# Patient Record
Sex: MALE | Race: WHITE | NOT HISPANIC OR LATINO | Employment: OTHER | URBAN - METROPOLITAN AREA
[De-identification: names, ages, dates, MRNs, and addresses within clinical notes are randomized per-mention and may not be internally consistent; named-entity substitution may affect disease eponyms.]

---

## 2017-01-01 ENCOUNTER — OFFICE VISIT (OUTPATIENT)
Dept: INTERNAL MEDICINE | Facility: CLINIC | Age: 72
End: 2017-01-01

## 2017-01-01 VITALS
WEIGHT: 240 LBS | TEMPERATURE: 98.5 F | HEART RATE: 80 BPM | SYSTOLIC BLOOD PRESSURE: 161 MMHG | HEIGHT: 68 IN | DIASTOLIC BLOOD PRESSURE: 74 MMHG | BODY MASS INDEX: 36.37 KG/M2 | OXYGEN SATURATION: 92 %

## 2017-01-01 DIAGNOSIS — R52 PAIN: ICD-10-CM

## 2017-01-01 DIAGNOSIS — R35.1 NOCTURIA: ICD-10-CM

## 2017-01-01 DIAGNOSIS — R61 NIGHT SWEATS: ICD-10-CM

## 2017-01-01 DIAGNOSIS — K21.9 GERD WITHOUT ESOPHAGITIS: ICD-10-CM

## 2017-01-01 DIAGNOSIS — M25.50 ARTHRALGIA OF MULTIPLE JOINTS: ICD-10-CM

## 2017-01-01 DIAGNOSIS — M15.9 GENERALIZED OSTEOARTHRITIS OF MULTIPLE SITES: ICD-10-CM

## 2017-01-01 DIAGNOSIS — Z00.00 MEDICARE ANNUAL WELLNESS VISIT, SUBSEQUENT: Primary | ICD-10-CM

## 2017-01-01 DIAGNOSIS — F10.21 ALCOHOL DEPENDENCE IN REMISSION (HCC): ICD-10-CM

## 2017-01-01 DIAGNOSIS — M1A.09X0 IDIOPATHIC CHRONIC GOUT OF MULTIPLE SITES WITHOUT TOPHUS: ICD-10-CM

## 2017-01-01 DIAGNOSIS — F32.89 OTHER DEPRESSION: ICD-10-CM

## 2017-01-01 DIAGNOSIS — Z79.899 CONTROLLED SUBSTANCE AGREEMENT SIGNED: ICD-10-CM

## 2017-01-01 DIAGNOSIS — I10 ESSENTIAL HYPERTENSION: ICD-10-CM

## 2017-01-01 LAB
ALBUMIN SERPL-MCNC: 3.6 G/DL (ref 3.5–5.2)
ALBUMIN/GLOB SERPL: 1.3 G/DL
ALP SERPL-CCNC: 64 U/L (ref 39–117)
ALT SERPL-CCNC: 16 U/L (ref 1–41)
APPEARANCE UR: CLEAR
AST SERPL-CCNC: 16 U/L (ref 1–40)
BACTERIA #/AREA URNS HPF: ABNORMAL /HPF
BASOPHILS # BLD AUTO: 0.02 10*3/MM3 (ref 0–0.2)
BASOPHILS NFR BLD AUTO: 0.3 % (ref 0–1.5)
BILIRUB SERPL-MCNC: 0.5 MG/DL (ref 0.1–1.2)
BILIRUB UR QL STRIP: NEGATIVE
BUN SERPL-MCNC: 10 MG/DL (ref 8–23)
BUN/CREAT SERPL: 9.1 (ref 7–25)
CALCIUM SERPL-MCNC: 8.7 MG/DL (ref 8.6–10.5)
CASTS URNS MICRO: ABNORMAL
CHLORIDE SERPL-SCNC: 100 MMOL/L (ref 98–107)
CHOLEST SERPL-MCNC: 204 MG/DL (ref 0–200)
CO2 SERPL-SCNC: 31.5 MMOL/L (ref 22–29)
COLOR UR: YELLOW
CREAT SERPL-MCNC: 1.1 MG/DL (ref 0.76–1.27)
CRP SERPL-MCNC: 0.72 MG/DL (ref 0–0.5)
DIFFERENTIAL COMMENT: NORMAL
EOSINOPHIL # BLD AUTO: 0.33 10*3/MM3 (ref 0–0.7)
EOSINOPHIL NFR BLD AUTO: 5.1 % (ref 0.3–6.2)
EPI CELLS #/AREA URNS HPF: ABNORMAL /HPF
ERYTHROCYTE [DISTWIDTH] IN BLOOD BY AUTOMATED COUNT: 13.6 % (ref 11.5–14.5)
GLOBULIN SER CALC-MCNC: 2.8 GM/DL
GLUCOSE SERPL-MCNC: 107 MG/DL (ref 65–99)
GLUCOSE UR QL: NEGATIVE
HBA1C MFR BLD: 5.48 % (ref 4.8–5.6)
HCT VFR BLD AUTO: 44 % (ref 40.4–52.2)
HDLC SERPL-MCNC: 45 MG/DL (ref 40–60)
HGB BLD-MCNC: 14.4 G/DL (ref 13.7–17.6)
HGB UR QL STRIP: (no result)
IMM GRANULOCYTES # BLD: 0.02 10*3/MM3 (ref 0–0.03)
IMM GRANULOCYTES NFR BLD: 0.3 % (ref 0–0.5)
KETONES UR QL STRIP: NEGATIVE
LDLC SERPL CALC-MCNC: 123 MG/DL (ref 0–100)
LDLC/HDLC SERPL: 2.73 {RATIO}
LEUKOCYTE ESTERASE UR QL STRIP: NEGATIVE
LYMPHOCYTES # BLD AUTO: 1.89 10*3/MM3 (ref 0.9–4.8)
LYMPHOCYTES NFR BLD AUTO: 29.1 % (ref 19.6–45.3)
MCH RBC QN AUTO: 35.4 PG (ref 27–32.7)
MCHC RBC AUTO-ENTMCNC: 32.7 G/DL (ref 32.6–36.4)
MCV RBC AUTO: 108.1 FL (ref 79.8–96.2)
MICROALBUMIN UR-MCNC: 10.9 UG/ML
MONOCYTES # BLD AUTO: 0.43 10*3/MM3 (ref 0.2–1.2)
MONOCYTES NFR BLD AUTO: 6.6 % (ref 5–12)
NEUTROPHILS # BLD AUTO: 3.8 10*3/MM3 (ref 1.9–8.1)
NEUTROPHILS NFR BLD AUTO: 58.6 % (ref 42.7–76)
NITRITE UR QL STRIP: NEGATIVE
PH UR STRIP: 5.5 [PH] (ref 5–8)
PLATELET # BLD AUTO: 194 10*3/MM3 (ref 140–500)
PLATELET BLD QL SMEAR: NORMAL
POTASSIUM SERPL-SCNC: 4.4 MMOL/L (ref 3.5–5.2)
PROT SERPL-MCNC: 6.4 G/DL (ref 6–8.5)
PROT UR QL STRIP: NEGATIVE
PSA SERPL-MCNC: 0.37 NG/ML (ref 0–4)
RBC # BLD AUTO: 4.07 10*6/MM3 (ref 4.6–6)
RBC #/AREA URNS HPF: ABNORMAL /HPF
RBC MORPH BLD: NORMAL
SODIUM SERPL-SCNC: 142 MMOL/L (ref 136–145)
SP GR UR: 1.02 (ref 1–1.03)
T4 FREE SERPL-MCNC: 1.05 NG/DL (ref 0.93–1.7)
TRIGL SERPL-MCNC: 181 MG/DL (ref 0–150)
TSH SERPL DL<=0.005 MIU/L-ACNC: 1.19 MIU/ML (ref 0.27–4.2)
URATE SERPL-MCNC: 4.8 MG/DL (ref 3.4–7)
UROBILINOGEN UR STRIP-MCNC: (no result) MG/DL
VLDLC SERPL CALC-MCNC: 36.2 MG/DL (ref 5–40)
WBC # BLD AUTO: 6.49 10*3/MM3 (ref 4.5–10.7)
WBC #/AREA URNS HPF: ABNORMAL /HPF

## 2017-01-01 PROCEDURE — 99214 OFFICE O/P EST MOD 30 MIN: CPT | Performed by: INTERNAL MEDICINE

## 2017-01-01 PROCEDURE — G0439 PPPS, SUBSEQ VISIT: HCPCS | Performed by: INTERNAL MEDICINE

## 2017-01-01 RX ORDER — CIMETIDINE 800 MG
TABLET ORAL
Qty: 90 TABLET | Refills: 0 | OUTPATIENT
Start: 2017-01-01

## 2017-01-01 RX ORDER — PAROXETINE HYDROCHLORIDE 20 MG/1
TABLET, FILM COATED ORAL
Qty: 90 TABLET | Refills: 1 | Status: SHIPPED | OUTPATIENT
Start: 2017-01-01 | End: 2018-01-01 | Stop reason: SDUPTHER

## 2017-01-01 RX ORDER — NAPROXEN 500 MG/1
TABLET ORAL
Qty: 60 TABLET | Refills: 10 | Status: SHIPPED | OUTPATIENT
Start: 2017-01-01 | End: 2018-01-01

## 2017-01-01 RX ORDER — HYDROCODONE BITARTRATE AND ACETAMINOPHEN 7.5; 325 MG/1; MG/1
1 TABLET ORAL EVERY 6 HOURS PRN
Qty: 120 TABLET | Refills: 0 | Status: SHIPPED | OUTPATIENT
Start: 2017-01-01 | End: 2017-01-01 | Stop reason: SDUPTHER

## 2017-01-01 RX ORDER — IRBESARTAN 300 MG/1
TABLET ORAL
Qty: 90 TABLET | Refills: 1 | Status: SHIPPED | OUTPATIENT
Start: 2017-01-01 | End: 2018-01-01 | Stop reason: SDUPTHER

## 2017-01-01 RX ORDER — ALLOPURINOL 300 MG/1
TABLET ORAL
Qty: 90 TABLET | Refills: 2 | Status: SHIPPED | OUTPATIENT
Start: 2017-01-01 | End: 2018-01-01

## 2017-01-01 RX ORDER — HYDROCODONE BITARTRATE AND ACETAMINOPHEN 7.5; 325 MG/1; MG/1
1 TABLET ORAL EVERY 6 HOURS PRN
Qty: 120 TABLET | Refills: 0 | Status: SHIPPED | OUTPATIENT
Start: 2017-01-01 | End: 2018-01-01 | Stop reason: SDUPTHER

## 2017-01-01 RX ORDER — CIMETIDINE 800 MG
TABLET ORAL
Qty: 90 TABLET | Refills: 1 | Status: SHIPPED | OUTPATIENT
Start: 2017-01-01 | End: 2018-01-01

## 2017-01-16 DIAGNOSIS — R52 PAIN: ICD-10-CM

## 2017-01-16 RX ORDER — HYDROCODONE BITARTRATE AND ACETAMINOPHEN 7.5; 325 MG/1; MG/1
1 TABLET ORAL EVERY 6 HOURS PRN
Qty: 120 TABLET | Refills: 0 | Status: SHIPPED | OUTPATIENT
Start: 2017-01-16 | End: 2017-01-18 | Stop reason: SDUPTHER

## 2017-01-16 NOTE — TELEPHONE ENCOUNTER
----- Message from Lupe Pierson sent at 1/16/2017  2:21 PM EST -----  Contact: pt  Pt calling would like refill on rx. Please call when ready for pickup.    HYDROcodone-acetaminophen (NORCO) 7.5-325 MG per tablet    Pt#  [phone]

## 2017-01-18 ENCOUNTER — TELEPHONE (OUTPATIENT)
Dept: INTERNAL MEDICINE | Facility: CLINIC | Age: 72
End: 2017-01-18

## 2017-01-18 ENCOUNTER — OFFICE VISIT (OUTPATIENT)
Dept: INTERNAL MEDICINE | Facility: CLINIC | Age: 72
End: 2017-01-18

## 2017-01-18 ENCOUNTER — RESULTS ENCOUNTER (OUTPATIENT)
Dept: INTERNAL MEDICINE | Facility: CLINIC | Age: 72
End: 2017-01-18

## 2017-01-18 VITALS
OXYGEN SATURATION: 96 % | HEIGHT: 68 IN | SYSTOLIC BLOOD PRESSURE: 138 MMHG | BODY MASS INDEX: 37.89 KG/M2 | DIASTOLIC BLOOD PRESSURE: 84 MMHG | WEIGHT: 250 LBS | HEART RATE: 61 BPM

## 2017-01-18 DIAGNOSIS — E78.5 HYPERLIPIDEMIA, UNSPECIFIED HYPERLIPIDEMIA TYPE: ICD-10-CM

## 2017-01-18 DIAGNOSIS — E11.9 DIABETES MELLITUS WITHOUT COMPLICATION (HCC): ICD-10-CM

## 2017-01-18 DIAGNOSIS — Z12.11 SCREENING FOR COLON CANCER: ICD-10-CM

## 2017-01-18 DIAGNOSIS — I10 ESSENTIAL HYPERTENSION: Primary | ICD-10-CM

## 2017-01-18 DIAGNOSIS — E53.8 LOW VITAMIN B12 LEVEL: ICD-10-CM

## 2017-01-18 DIAGNOSIS — K21.9 GERD WITHOUT ESOPHAGITIS: ICD-10-CM

## 2017-01-18 LAB
ALBUMIN SERPL-MCNC: 3.53 G/DL (ref 3.4–4.6)
ALBUMIN/GLOB SERPL: 1.2 G/DL
ALP SERPL-CCNC: 81 U/L (ref 46–116)
ALT SERPL W P-5'-P-CCNC: 23 U/L (ref 16–63)
ANION GAP SERPL CALCULATED.3IONS-SCNC: 8 MMOL/L
AST SERPL-CCNC: 25 U/L (ref 7–37)
BILIRUB SERPL-MCNC: 0.8 MG/DL (ref 0.2–1)
BUN BLD-MCNC: 12 MG/DL (ref 6–22)
BUN/CREAT SERPL: 11.8 (ref 7–25)
CALCIUM SPEC-SCNC: 8.7 MG/DL (ref 8.6–10.5)
CHLORIDE SERPL-SCNC: 101 MMOL/L (ref 95–107)
CHOLEST SERPL-MCNC: 240 MG/DL (ref 0–200)
CO2 SERPL-SCNC: 30 MMOL/L (ref 23–32)
CREAT BLD-MCNC: 1.02 MG/DL (ref 0.7–1.3)
FOLATE SERPL-MCNC: 3.46 NG/ML (ref 4.78–24.2)
GFR SERPL CREATININE-BSD FRML MDRD: 72 ML/MIN/1.73
GLOBULIN UR ELPH-MCNC: 2.9 GM/DL
GLUCOSE BLD-MCNC: 125 MG/DL (ref 70–100)
HBA1C MFR BLD: 5.49 % (ref 4.8–5.6)
HDLC SERPL-MCNC: 59 MG/DL (ref 40–81)
LDLC SERPL CALC-MCNC: 131 MG/DL (ref 0–100)
LDLC/HDLC SERPL: 2.23 {RATIO}
POTASSIUM BLD-SCNC: 4 MMOL/L (ref 3.3–5.3)
PROT SERPL-MCNC: 6.4 G/DL (ref 6.3–8.4)
SODIUM BLD-SCNC: 139 MMOL/L (ref 136–145)
TRIGL SERPL-MCNC: 248 MG/DL (ref 0–150)
VIT B12 SERPL-MCNC: 168 PG/ML (ref 211–946)
VLDLC SERPL-MCNC: 49.6 MG/DL

## 2017-01-18 PROCEDURE — 80053 COMPREHEN METABOLIC PANEL: CPT | Performed by: NURSE PRACTITIONER

## 2017-01-18 PROCEDURE — 99214 OFFICE O/P EST MOD 30 MIN: CPT | Performed by: NURSE PRACTITIONER

## 2017-01-18 PROCEDURE — 36415 COLL VENOUS BLD VENIPUNCTURE: CPT | Performed by: NURSE PRACTITIONER

## 2017-01-18 PROCEDURE — 80061 LIPID PANEL: CPT | Performed by: NURSE PRACTITIONER

## 2017-01-18 NOTE — TELEPHONE ENCOUNTER
----- Message from ENDER Patel sent at 1/18/2017 10:06 AM EST -----  Regarding: Yu  Please order Yu for pt. Thanks.

## 2017-01-18 NOTE — PROGRESS NOTES
"Subjective   Steve Montana is a 71 y.o. male.         HPI Comments: He is feeling better since his last visit, states difficulty taking a deep breath has resolved (stopped Anoro due to \"feeling weird\" with medication).  His TG were elevated at last visit (+hx alcohol use).    Hyperlipidemia   This is a chronic problem. The current episode started more than 1 year ago. The problem is uncontrolled. Exacerbating diseases include diabetes. Pertinent negatives include no chest pain. He is currently on no antihyperlipidemic treatment.   Hypertension   This is a chronic problem. The current episode started more than 1 year ago. The problem is unchanged. The problem is controlled. Pertinent negatives include no chest pain, headaches, malaise/fatigue, palpitations or peripheral edema. There are no associated agents to hypertension. Risk factors for coronary artery disease include diabetes mellitus, male gender and obesity. Past treatments include ACE inhibitors and diuretics. The current treatment provides significant improvement. There are no compliance problems.  There is no history of angina, kidney disease or CAD/MI.   Diabetes   He presents for his follow-up diabetic visit. He has type 2 diabetes mellitus. No MedicAlert identification noted. Disease course: Gen OK on the blood tests: he doesn't check. There are no hypoglycemic associated symptoms. Pertinent negatives for hypoglycemia include no headaches. Associated symptoms include fatigue. Pertinent negatives for diabetes include no chest pain, no polydipsia, no visual change, no weakness and no weight loss. There are no hypoglycemic complications. Symptoms are stable. There are no diabetic complications. Risk factors for coronary artery disease include diabetes mellitus, dyslipidemia, male sex and sedentary lifestyle. When asked about current treatments, none were reported. Frequency home blood tests: doesn't test. An ACE inhibitor/angiotensin II receptor blocker is " being taken.        The following portions of the patient's history were reviewed and updated as appropriate: allergies, current medications, past social history and problem list.    Past Medical History   Diagnosis Date   • Abdominal cramping, periumbilical    • Diabetes mellitus type II, controlled, with no complications    • Essential hypertension, benign    • Hyperlipidemia    • Meningioma    • Neoplasm of uncertain behavior of skin    • Osteoarthritis, multiple sites    • Tobacco use disorder          Current Outpatient Prescriptions:   •  allopurinol (ZYLOPRIM) 300 MG tablet, TAKE ONE TABLET BY MOUTH ONCE A DAY, Disp: 90 tablet, Rfl: 3  •  aspirin 81 MG tablet, Take 81 mg by mouth., Disp: , Rfl:   •  cimetidine (TAGAMET) 800 MG tablet, TAKE ONE TABLET BY MOUTH DAILY, Disp: 90 tablet, Rfl: 0  •  hydrochlorothiazide (HYDRODIURIL) 12.5 MG tablet, Take 12.5 mg by mouth daily., Disp: , Rfl:   •  HYDROcodone-acetaminophen (NORCO) 7.5-325 MG per tablet, Take 1 tablet by mouth Every 6 (Six) Hours As Needed for moderate pain (4-6)., Disp: 120 tablet, Rfl: 0  •  irbesartan (AVAPRO) 300 MG tablet, TAKE ONE TABLET BY MOUTH ONCE DAILY, Disp: 90 tablet, Rfl: 3  •  lisinopril (PRINIVIL,ZESTRIL) 20 MG tablet, Take 20 mg by mouth., Disp: , Rfl:   •  naproxen (NAPROSYN) 500 MG tablet, Take 500 mg by mouth 2 (two) times a day with meals., Disp: , Rfl:   •  PARoxetine (PAXIL) 20 MG tablet, Take 20 mg by mouth every morning., Disp: , Rfl:     No Known Allergies    Review of Systems   Constitutional: Positive for fatigue. Negative for chills, fever, malaise/fatigue, unexpected weight change and weight loss.   HENT: Negative for congestion, ear pain, postnasal drip, sinus pressure, sore throat and trouble swallowing.    Eyes: Negative for visual disturbance.   Respiratory: Negative for cough, chest tightness and wheezing.    Cardiovascular: Negative for chest pain, palpitations and leg swelling.   Gastrointestinal: Negative for  "abdominal pain, blood in stool, nausea and vomiting.   Endocrine: Negative for cold intolerance, heat intolerance and polydipsia.   Genitourinary: Negative for dysuria, frequency and urgency.   Musculoskeletal: Negative for arthralgias and joint swelling.   Skin: Negative for color change.   Allergic/Immunologic: Negative for immunocompromised state.   Neurological: Negative for syncope, weakness and headaches.   Hematological: Does not bruise/bleed easily.       Objective   Vitals:    01/18/17 0940 01/18/17 0953   BP: 140/88 138/84   BP Location: Left arm Left arm   Patient Position: Sitting    Cuff Size: Adult    Pulse: 61    SpO2: 96%    Weight: 250 lb (113 kg)    Height: 68\" (172.7 cm)      Physical Exam   Constitutional: He is oriented to person, place, and time. He appears well-developed and well-nourished. No distress.   HENT:   Head: Normocephalic and atraumatic.   Right Ear: External ear normal.   Left Ear: External ear normal.   Eyes: Conjunctivae and EOM are normal. Pupils are equal, round, and reactive to light. No scleral icterus.   Neck: Normal range of motion. Neck supple. No thyromegaly present.   Cardiovascular: Normal rate, regular rhythm, normal heart sounds and intact distal pulses.  Exam reveals no gallop and no friction rub.    No murmur heard.  Pulmonary/Chest: Effort normal and breath sounds normal. No respiratory distress.   Lymphadenopathy:     He has no cervical adenopathy.   Neurological: He is alert and oriented to person, place, and time.   Skin: Skin is warm and dry. No rash noted. No erythema.   Psychiatric: He has a normal mood and affect. His behavior is normal.   Nursing note and vitals reviewed.      Assessment/Plan   Steve was seen today for hyperlipidemia, hypertension and diabetes.    Diagnoses and all orders for this visit:    Essential hypertension  Comments:  elevated today, he will monitor at home and call if >140/90  Orders:  -     Comprehensive Metabolic Panel; Future  -  "    Comprehensive Metabolic Panel    Low vitamin B12 level  Comments:  recheck today  Orders:  -     Vitamin B12 & Folate; Future  -     Vitamin B12 & Folate    Diabetes mellitus without complication  -     Hemoglobin A1c; Future  -     Hemoglobin A1c    GERD without esophagitis    Screening for colon cancer  Comments:  has declined additional colonoscopy, will order ColoGuard  Orders:  -     Cologuard; Future    Hyperlipidemia, unspecified hyperlipidemia type  Comments:  elevated TG with last labs, recheck today  Orders:  -     Lipid Panel; Future  -     Lipid Panel

## 2017-01-18 NOTE — MR AVS SNAPSHOT
Steve Montana   1/18/2017 10:00 AM   Office Visit    Dept Phone:  [phone]   Encounter #:  [id number]    Provider:  ENDER Patel   Department:  [facility] INTERNAL MEDICINE                Your Full Care Plan              Today's Medication Changes          These changes are accurate as of: 1/18/17 10:11 AM.  If you have any questions, ask your nurse or doctor.               Medication(s)that have changed:     HYDROcodone-acetaminophen 7.5-325 MG per tablet   Commonly known as:  NORCO   Take 1 tablet by mouth Every 6 (Six) Hours As Needed for moderate pain (4-6).   What changed:  Another medication with the same name was removed. Continue taking this medication, and follow the directions you see here.   Changed by:  Laura Weeks MA         Stop taking medication(s)listed here:     meclizine 25 MG tablet   Commonly known as:  ANTIVERT   Stopped by:  ENDER Patel           sildenafil 50 MG tablet   Commonly known as:  VIAGRA   Stopped by:  ENDER Patel           Umeclidinium-Vilanterol 62.5-25 MCG/INH aerosol powder    Commonly known as:  ANORO ELLIPTA   Stopped by:  ENDER Patel                      Your Updated Medication List          This list is accurate as of: 1/18/17 10:11 AM.  Always use your most recent med list.                allopurinol 300 MG tablet   Commonly known as:  ZYLOPRIM   TAKE ONE TABLET BY MOUTH ONCE A DAY       aspirin 81 MG tablet       cimetidine 800 MG tablet   Commonly known as:  TAGAMET   TAKE ONE TABLET BY MOUTH DAILY       hydrochlorothiazide 12.5 MG tablet   Commonly known as:  HYDRODIURIL       HYDROcodone-acetaminophen 7.5-325 MG per tablet   Commonly known as:  NORCO   Take 1 tablet by mouth Every 6 (Six) Hours As Needed for moderate pain (4-6).       irbesartan 300 MG tablet   Commonly known as:  AVAPRO   TAKE ONE TABLET BY MOUTH ONCE DAILY       lisinopril 20 MG tablet   Commonly known as:   PRINIVIL,ZESTRIL       naproxen 500 MG tablet   Commonly known as:  NAPROSYN       PARoxetine 20 MG tablet   Commonly known as:  PAXIL               You Were Diagnosed With        Codes Comments    Essential hypertension    -  Primary ICD-10-CM: I10  ICD-9-CM: 401.9     Low vitamin B12 level     ICD-10-CM: E53.8  ICD-9-CM: 266.2     Diabetes mellitus without complication     ICD-10-CM: E11.9  ICD-9-CM: 250.00     GERD without esophagitis     ICD-10-CM: K21.9  ICD-9-CM: 530.81     Screening for colon cancer     ICD-10-CM: Z12.11  ICD-9-CM: V76.51     Hyperlipidemia, unspecified hyperlipidemia type     ICD-10-CM: E78.5  ICD-9-CM: 272.4       Instructions     None    Patient Instructions History      Upcoming Appointments     Visit Type Date Time Department    FOLLOW UP 2017 10:00 AM [facility]    FOLLOW UP 2017  9:45 AM [facility] Signup     [employer] allows you to send messages to your doctor, view your test results, renew your prescriptions, schedule appointments, and more. To sign up, go to [email or web address] and click on the Sign Up Now link in the New User? box. Enter your [employer] Activation Code exactly as it appears below along with the last four digits of your Social Security Number and your Date of Birth () to complete the sign-up process. If you do not sign up before the expiration date, you must request a new code.    [employer]uin Activation Code: [email or web address]  Expires: 2017 10:08 AM    If you have questions, you can email [email or web address] or call [phone] to talk to our duuin staff. Remember, [employer] is NOT to be used for urgent needs. For medical emergencies, dial 911.               Other Info from Your Visit           Your Appointments     2017  9:45 AM EDT   Follow Up with ENDER Patel   [facility] INTERNAL MEDICINE (--)    [address] [address] [phone]         "   Arrive 15 minutes prior to appointment.              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

## 2017-01-26 ENCOUNTER — TELEPHONE (OUTPATIENT)
Dept: INTERNAL MEDICINE | Facility: CLINIC | Age: 72
End: 2017-01-26

## 2017-01-26 NOTE — TELEPHONE ENCOUNTER
----- Message from Kendy Otoole MA sent at 1/26/2017 10:59 AM EST -----  Pt returning call and says to leave [facility] if he is not available regarding lab results    Pt#[phone]

## 2017-02-14 RX ORDER — PAROXETINE HYDROCHLORIDE 20 MG/1
TABLET, FILM COATED ORAL
Qty: 90 TABLET | Refills: 1 | Status: SHIPPED | OUTPATIENT
Start: 2017-02-14 | End: 2017-01-01 | Stop reason: SDUPTHER

## 2017-02-21 DIAGNOSIS — R52 PAIN: ICD-10-CM

## 2017-02-21 RX ORDER — HYDROCODONE BITARTRATE AND ACETAMINOPHEN 7.5; 325 MG/1; MG/1
1 TABLET ORAL EVERY 6 HOURS PRN
Qty: 120 TABLET | Refills: 0 | Status: SHIPPED | OUTPATIENT
Start: 2017-02-21 | End: 2017-03-20 | Stop reason: SDUPTHER

## 2017-02-21 NOTE — TELEPHONE ENCOUNTER
----- Message from Annabelle Moraes sent at 2/21/2017 10:55 AM EST -----  Contact: Patient  Patient called requesting refill on     HYDROcodone-acetaminophen (NORCO) 7.5-325 MG per tablet    Please call when ready to be picked up.    Patient:  [phone]

## 2017-02-23 ENCOUNTER — TELEPHONE (OUTPATIENT)
Dept: INTERNAL MEDICINE | Facility: CLINIC | Age: 72
End: 2017-02-23

## 2017-02-23 DIAGNOSIS — E53.8 FOLATE DEFICIENCY: Primary | ICD-10-CM

## 2017-02-23 RX ORDER — FOLIC ACID 1 MG/1
1 TABLET ORAL DAILY
Qty: 30 TABLET | Refills: 5 | Status: SHIPPED | OUTPATIENT
Start: 2017-02-23

## 2017-02-23 NOTE — TELEPHONE ENCOUNTER
Discussed recent lab results with patient and starting Vitamin B12 and folic acid (will discuss beginning injections at f/u appt in April). Please send copy of labs to patient. Thanks.

## 2017-02-23 NOTE — TELEPHONE ENCOUNTER
Pt has not returned call regarding recent labs and low Vitamin B12 and folate levels. Called patient again and left message (per his request) that I have escribed Folic Acid to the pharmacy and that he will need to begin Vitamin B12 injections. He is advised to call the office for further instruction.

## 2017-02-23 NOTE — TELEPHONE ENCOUNTER
----- Message from Bryanna Garay sent at 2/23/2017  1:54 PM EST -----  Contact: pt - Dr Narvaez' pt - RE: return call  Pt returning call and would like a return call. Could you please return pt's call? Please advise. Thanks       Pt # [phone]

## 2017-03-02 DIAGNOSIS — K21.9 GERD WITHOUT ESOPHAGITIS: ICD-10-CM

## 2017-03-03 RX ORDER — CIMETIDINE 800 MG
TABLET ORAL
Qty: 90 TABLET | Refills: 1 | Status: SHIPPED | OUTPATIENT
Start: 2017-03-03 | End: 2017-01-01 | Stop reason: SDUPTHER

## 2017-03-03 RX ORDER — HYDROCHLOROTHIAZIDE 12.5 MG/1
TABLET ORAL
Qty: 90 TABLET | Refills: 3 | Status: SHIPPED | OUTPATIENT
Start: 2017-03-03 | End: 2018-01-01 | Stop reason: HOSPADM

## 2017-03-20 DIAGNOSIS — R52 PAIN: ICD-10-CM

## 2017-03-20 RX ORDER — HYDROCODONE BITARTRATE AND ACETAMINOPHEN 7.5; 325 MG/1; MG/1
1 TABLET ORAL EVERY 6 HOURS PRN
Qty: 120 TABLET | Refills: 0 | Status: SHIPPED | OUTPATIENT
Start: 2017-03-20 | End: 2017-04-18 | Stop reason: SDUPTHER

## 2017-03-20 NOTE — TELEPHONE ENCOUNTER
----- Message from Bryanna Garay sent at 3/20/2017  9:12 AM EDT -----  Contact: pt - Dr Narvaez' pt - RE: script  Pt calling and would like a refill on Rx      HYDROcodone-acetaminophen (NORCO) 7.5-325 MG per tablet 120 tablet     Sig - Route: Take 1 tablet by mouth Every 6 (Six) Hours As Needed for moderate pain (4-6). - Oral        Pt # [phone]

## 2017-04-18 DIAGNOSIS — R52 PAIN: ICD-10-CM

## 2017-04-18 RX ORDER — HYDROCODONE BITARTRATE AND ACETAMINOPHEN 7.5; 325 MG/1; MG/1
1 TABLET ORAL EVERY 6 HOURS PRN
Qty: 120 TABLET | Refills: 0 | Status: SHIPPED | OUTPATIENT
Start: 2017-04-18 | End: 2017-05-17 | Stop reason: SDUPTHER

## 2017-04-18 NOTE — TELEPHONE ENCOUNTER
----- Message from Bryanna Garay sent at 4/18/2017  2:34 PM EDT -----  Contact: pt - Dr Narvaez' pt - RE: script  Pt calling and would like a refill on Rx      HYDROcodone-acetaminophen (NORCO) 7.5-325 MG per tablet 120 tablet      Sig - Route: Take 1 tablet by mouth Every 6 (Six) Hours As Needed for Moderate Pain (4-6). - Oral      Pt # [phone]

## 2017-04-20 ENCOUNTER — OFFICE VISIT (OUTPATIENT)
Dept: INTERNAL MEDICINE | Facility: CLINIC | Age: 72
End: 2017-04-20

## 2017-04-20 VITALS
HEIGHT: 68 IN | OXYGEN SATURATION: 95 % | HEART RATE: 59 BPM | DIASTOLIC BLOOD PRESSURE: 84 MMHG | BODY MASS INDEX: 36.53 KG/M2 | WEIGHT: 241 LBS | SYSTOLIC BLOOD PRESSURE: 124 MMHG

## 2017-04-20 DIAGNOSIS — E53.8 FOLATE DEFICIENCY: ICD-10-CM

## 2017-04-20 DIAGNOSIS — E53.8 LOW VITAMIN B12 LEVEL: ICD-10-CM

## 2017-04-20 DIAGNOSIS — I10 ESSENTIAL HYPERTENSION: ICD-10-CM

## 2017-04-20 DIAGNOSIS — E11.9 DIABETES MELLITUS WITHOUT COMPLICATION (HCC): Primary | ICD-10-CM

## 2017-04-20 LAB
ALBUMIN SERPL-MCNC: 3.64 G/DL (ref 3.4–4.6)
ALBUMIN/GLOB SERPL: 1.2 G/DL
ALP SERPL-CCNC: 72 U/L (ref 46–116)
ALT SERPL W P-5'-P-CCNC: 22 U/L (ref 16–63)
ANION GAP SERPL CALCULATED.3IONS-SCNC: 8 MMOL/L
AST SERPL-CCNC: 18 U/L (ref 7–37)
BILIRUB SERPL-MCNC: 0.6 MG/DL (ref 0.2–1)
BUN BLD-MCNC: 11 MG/DL (ref 6–22)
BUN/CREAT SERPL: 10.8 (ref 7–25)
CALCIUM SPEC-SCNC: 8.8 MG/DL (ref 8.6–10.5)
CHLORIDE SERPL-SCNC: 99 MMOL/L (ref 95–107)
CO2 SERPL-SCNC: 31 MMOL/L (ref 23–32)
CREAT BLD-MCNC: 1.02 MG/DL (ref 0.7–1.3)
FOLATE SERPL-MCNC: 12.72 NG/ML (ref 4.78–24.2)
GFR SERPL CREATININE-BSD FRML MDRD: 72 ML/MIN/1.73
GLOBULIN UR ELPH-MCNC: 3 GM/DL
GLUCOSE BLD-MCNC: 125 MG/DL (ref 70–100)
HBA1C MFR BLD: 5.5 % (ref 4–6)
POTASSIUM BLD-SCNC: 3.7 MMOL/L (ref 3.3–5.3)
PROT SERPL-MCNC: 6.6 G/DL (ref 6.3–8.4)
SODIUM BLD-SCNC: 138 MMOL/L (ref 136–145)
VIT B12 SERPL-MCNC: 364 PG/ML (ref 211–946)

## 2017-04-20 PROCEDURE — 80053 COMPREHEN METABOLIC PANEL: CPT | Performed by: NURSE PRACTITIONER

## 2017-04-20 PROCEDURE — 83036 HEMOGLOBIN GLYCOSYLATED A1C: CPT | Performed by: NURSE PRACTITIONER

## 2017-04-20 PROCEDURE — 99214 OFFICE O/P EST MOD 30 MIN: CPT | Performed by: NURSE PRACTITIONER

## 2017-04-21 ENCOUNTER — TELEPHONE (OUTPATIENT)
Dept: INTERNAL MEDICINE | Facility: CLINIC | Age: 72
End: 2017-04-21

## 2017-04-21 RX ORDER — IRBESARTAN 300 MG/1
300 TABLET ORAL DAILY
Qty: 90 TABLET | Refills: 3
Start: 2017-04-21 | End: 2018-01-01 | Stop reason: SDUPTHER

## 2017-04-21 NOTE — PROGRESS NOTES
Subjective   Steve Montana is a 71 y.o. male who presents for f/u regarding DM2, HTN, and hyperlipidemia.    HPI Comments: He started taking oral Vitamin B12 and Folic Acid since last visit and reports mild improvement in energy. Unfortunately he continues to drink daily.    Hyperlipidemia   This is a chronic problem. The current episode started more than 1 year ago. The problem is uncontrolled. Exacerbating diseases include diabetes. Pertinent negatives include no chest pain. He is currently on no antihyperlipidemic treatment.   Hypertension   This is a chronic problem. The current episode started more than 1 year ago. The problem is unchanged. The problem is controlled. Pertinent negatives include no chest pain, headaches, malaise/fatigue, palpitations or peripheral edema. There are no associated agents to hypertension. Risk factors for coronary artery disease include diabetes mellitus, male gender and obesity. Past treatments include ACE inhibitors and diuretics. The current treatment provides significant improvement. There are no compliance problems.  There is no history of angina, kidney disease or CAD/MI.   Diabetes   He presents for his follow-up diabetic visit. He has type 2 diabetes mellitus. No MedicAlert identification noted. Disease course: Gen OK on the blood tests: he doesn't check. There are no hypoglycemic associated symptoms. Pertinent negatives for hypoglycemia include no headaches. Associated symptoms include fatigue. Pertinent negatives for diabetes include no chest pain, no polydipsia, no visual change, no weakness and no weight loss. There are no hypoglycemic complications. Symptoms are stable. There are no diabetic complications. Risk factors for coronary artery disease include diabetes mellitus, dyslipidemia, male sex and sedentary lifestyle. When asked about current treatments, none were reported. Frequency home blood tests: doesn't test. An ACE inhibitor/angiotensin II receptor blocker is  being taken.        The following portions of the patient's history were reviewed and updated as appropriate: allergies, current medications, past social history and problem list.    Past Medical History:   Diagnosis Date   • Abdominal cramping, periumbilical    • Diabetes mellitus type II, controlled, with no complications    • Essential hypertension, benign    • Hyperlipidemia    • Meningioma    • Neoplasm of uncertain behavior of skin    • Osteoarthritis, multiple sites    • Tobacco use disorder          Current Outpatient Prescriptions:   •  allopurinol (ZYLOPRIM) 300 MG tablet, TAKE ONE TABLET BY MOUTH ONCE A DAY, Disp: 90 tablet, Rfl: 3  •  aspirin 81 MG tablet, Take 81 mg by mouth., Disp: , Rfl:   •  cimetidine (TAGAMET) 800 MG tablet, TAKE ONE TABLET BY MOUTH DAILY, Disp: 90 tablet, Rfl: 1  •  folic acid (FOLVITE) 1 MG tablet, Take 1 tablet by mouth Daily., Disp: 30 tablet, Rfl: 5  •  hydrochlorothiazide (HYDRODIURIL) 12.5 MG tablet, TAKE ONE TABLET BY MOUTH EVERY MORNING, Disp: 90 tablet, Rfl: 3  •  HYDROcodone-acetaminophen (NORCO) 7.5-325 MG per tablet, Take 1 tablet by mouth Every 6 (Six) Hours As Needed for Moderate Pain (4-6)., Disp: 120 tablet, Rfl: 0  •  irbesartan (AVAPRO) 300 MG tablet, Take 1 tablet by mouth Daily., Disp: 90 tablet, Rfl: 3  •  naproxen (NAPROSYN) 500 MG tablet, Take 500 mg by mouth 2 (two) times a day with meals., Disp: , Rfl:   •  PARoxetine (PAXIL) 20 MG tablet, TAKE ONE TABLET BY MOUTH DAILY, Disp: 90 tablet, Rfl: 1  •  cyanocobalamin ([employer]S VITAMIN B-12) 1000 MCG tablet, 1 tablet daily, Disp: 30 tablet, Rfl: 5    No Known Allergies    Review of Systems   Constitutional: Positive for fatigue. Negative for chills, fever, malaise/fatigue, unexpected weight change and weight loss.   HENT: Negative for congestion, ear pain, postnasal drip, sinus pressure, sore throat and trouble swallowing.    Eyes: Negative for visual disturbance.   Respiratory: Negative for cough, chest tightness  "and wheezing.    Cardiovascular: Negative for chest pain, palpitations and leg swelling.   Gastrointestinal: Negative for abdominal pain, blood in stool, nausea and vomiting.   Endocrine: Negative for cold intolerance, heat intolerance and polydipsia.   Genitourinary: Negative for dysuria, frequency and urgency.   Musculoskeletal: Negative for arthralgias and joint swelling.   Skin: Negative for color change.   Allergic/Immunologic: Negative for immunocompromised state.   Neurological: Negative for syncope, weakness and headaches.   Hematological: Does not bruise/bleed easily.       Objective   Vitals:    04/20/17 0952   BP: 124/84   BP Location: Left arm   Patient Position: Sitting   Cuff Size: Adult   Pulse: 59   SpO2: 95%   Weight: 241 lb (109 kg)   Height: 68\" (172.7 cm)     Physical Exam   Constitutional: He is oriented to person, place, and time. He appears well-developed and well-nourished. No distress.   HENT:   Head: Normocephalic and atraumatic.   Right Ear: External ear normal.   Left Ear: External ear normal.   Eyes: Conjunctivae are normal.   Neck: Normal range of motion. Neck supple.   Cardiovascular: Normal rate, regular rhythm, normal heart sounds and intact distal pulses.  Exam reveals no gallop and no friction rub.    No murmur heard.  Pulmonary/Chest: Effort normal and breath sounds normal. No respiratory distress.   Lymphadenopathy:     He has no cervical adenopathy.   Neurological: He is alert and oriented to person, place, and time.   Skin: Skin is warm and dry. No rash noted. No erythema.   Psychiatric: He has a normal mood and affect. His behavior is normal.   Nursing note and vitals reviewed.      Assessment/Plan   Steve was seen today for hyperlipidemia, hypertension and knee pain.    Diagnoses and all orders for this visit:    Diabetes mellitus without complication  -     Hemoglobin A1c; Future  -     Hemoglobin A1c    Low vitamin B12 level  Comments:  he declined injections with last " labs, will recheck levels since taking oral suppl  Orders:  -     cyanocobalamin (CVS VITAMIN B-12) 1000 MCG tablet; 1 tablet daily  -     Vitamin B12 & Folate; Future  -     Vitamin B12 & Folate    Folate deficiency  -     Vitamin B12 & Folate; Future  -     Vitamin B12 & Folate    Essential hypertension  Comments:  stable on meds  Orders:  -     Comprehensive Metabolic Panel; Future  -     Comprehensive Metabolic Panel  -     irbesartan (AVAPRO) 300 MG tablet; Take 1 tablet by mouth Daily.    He has declined screening colonoscopy but did complete ColoGuard 3/2017, will request copy of results.

## 2017-05-17 DIAGNOSIS — R52 PAIN: ICD-10-CM

## 2017-05-17 RX ORDER — HYDROCODONE BITARTRATE AND ACETAMINOPHEN 7.5; 325 MG/1; MG/1
1 TABLET ORAL EVERY 6 HOURS PRN
Qty: 120 TABLET | Refills: 0 | Status: SHIPPED | OUTPATIENT
Start: 2017-05-17 | End: 2017-06-19 | Stop reason: SDUPTHER

## 2017-06-19 DIAGNOSIS — R52 PAIN: ICD-10-CM

## 2017-06-19 RX ORDER — HYDROCODONE BITARTRATE AND ACETAMINOPHEN 7.5; 325 MG/1; MG/1
1 TABLET ORAL EVERY 6 HOURS PRN
Qty: 120 TABLET | Refills: 0 | Status: SHIPPED | OUTPATIENT
Start: 2017-06-19 | End: 2017-01-01 | Stop reason: SDUPTHER

## 2017-06-19 NOTE — TELEPHONE ENCOUNTER
----- Message from Kendy Otoole MA sent at 6/19/2017  8:32 AM EDT -----  Pt calling for refill    Hydrocodone 7.5/325mg    lov 4/20  Next appt 8/10    Pt#[phone]

## 2017-07-18 NOTE — TELEPHONE ENCOUNTER
----- Message from Bryanna Garay sent at 7/18/2017  3:03 PM EDT -----  Contact: pt - Dr Narvaez'  pt - RE: script  Pt calling and would like a refill on Rx      HYDROcodone-acetaminophen (NORCO) 7.5-325 MG per tablet 120 tablet      Sig - Route: Take 1 tablet by mouth Every 6 (Six) Hours As Needed for Moderate Pain (4-6). - Oral      Pt # [phone]

## 2017-08-08 PROBLEM — M25.50 ARTHRALGIA OF MULTIPLE JOINTS: Status: ACTIVE | Noted: 2017-01-01

## 2017-08-08 PROBLEM — Z79.899 CONTROLLED SUBSTANCE AGREEMENT SIGNED: Status: ACTIVE | Noted: 2017-01-01

## 2017-08-08 PROBLEM — M15.9 GENERALIZED OSTEOARTHRITIS OF MULTIPLE SITES: Status: ACTIVE | Noted: 2017-01-01

## 2017-08-08 PROBLEM — F32.A DEPRESSION: Status: ACTIVE | Noted: 2017-01-01

## 2017-08-08 PROBLEM — M1A.09X0 CHRONIC GOUT OF MULTIPLE SITES: Status: ACTIVE | Noted: 2017-01-01

## 2017-08-08 PROBLEM — Z00.00 MEDICARE ANNUAL WELLNESS VISIT, SUBSEQUENT: Status: ACTIVE | Noted: 2017-01-01

## 2017-08-08 PROBLEM — R61 NIGHT SWEATS: Status: ACTIVE | Noted: 2017-01-01

## 2017-08-08 PROBLEM — R35.1 NOCTURIA: Status: ACTIVE | Noted: 2017-01-01

## 2017-08-08 NOTE — PATIENT INSTRUCTIONS
Medicare Wellness  Personal Prevention Plan of Service     Date of Office Visit:  2017  Encounter Provider:  Todd Craven MD  Place of Service:  [facility] INTERNAL MEDICINE  Patient Name: Steve Montana  :  1945    As part of the Medicare Wellness portion of your visit today, we are providing you with this personalized preventive plan of services (PPPS). This plan is based upon recommendations of the [place] Preventive Services Task Force (USPSTF) and the Advisory Committee on Immunization Practices (ACIP).    This lists the preventive care services that should be considered, and provides dates of when you are due. Items listed as completed are up-to-date and do not require any further intervention.    Health Maintenance   Topic Date Due   • TDAP/TD VACCINES (1 - Tdap) 1965   • ZOSTER VACCINE  2016   • INFLUENZA VACCINE  2017   • HEMOGLOBIN A1C  10/20/2017   • LIPID PANEL  2018   • DIABETIC EYE EXAM  2018   • MEDICARE ANNUAL WELLNESS  2018   • DIABETIC FOOT EXAM  2018   • URINE MICROALBUMIN  2018   • COLONOSCOPY  2027   • HEPATITIS C SCREENING  Addressed   • PNEUMOCOCCAL VACCINES (65+ LOW/MEDIUM RISK)  Completed   • AAA SCREEN (ONE-TIME)  Addressed       Orders Placed This Encounter   Procedures   • Hemoglobin A1c   • Lipid Panel With LDL / HDL Ratio   • PSA   • Comprehensive Metabolic Panel   • T4, Free   • TSH   • C-reactive Protein   • MicroAlbumin, Urine, Random   • Uric Acid   • CBC & Differential     Order Specific Question:   Manual Differential     Answer:   No   • Urinalysis With Microscopic       No Follow-up on file.

## 2017-08-20 NOTE — PROGRESS NOTES
Subjective   Steve Montana is a 72 y.o. male.   He is here today for Medicare annual wellness visit subsequent along with depression and alcohol dependence in remission hypertension arthralgia multiple joints chronic gout osteoarthritis multiple sites control substance agreement signed night sweats nocturia and pain  History of Present Illness   He is here today for Medicare annual visit subsequent along with depression and alcohol dependence in remission hypertension arthralgia multiple joints chronic gout osteoarthritis of multiple sites control substance agreement signed and night sweats and nocturia and pain  The following portions of the patient's history were reviewed and updated as appropriate: allergies, current medications, past family history, past medical history, past social history, past surgical history and problem list.    Review of Systems   Constitutional: Positive for diaphoresis.   Musculoskeletal: Positive for arthralgias.   All other systems reviewed and are negative.      Objective   Physical Exam   Constitutional: He is oriented to person, place, and time. He appears well-developed and well-nourished. He is cooperative.   HENT:   Head: Normocephalic and atraumatic.   Right Ear: Hearing, tympanic membrane, external ear and ear canal normal.   Left Ear: Hearing, tympanic membrane, external ear and ear canal normal.   Nose: Nose normal.   Mouth/Throat: Uvula is midline, oropharynx is clear and moist and mucous membranes are normal.   Eyes: Conjunctivae, EOM and lids are normal. Pupils are equal, round, and reactive to light.   Neck: Phonation normal. Neck supple. Carotid bruit is not present.   Cardiovascular: Normal rate, regular rhythm and normal heart sounds.  Exam reveals no gallop and no friction rub.    No murmur heard.  Pulmonary/Chest: Effort normal and breath sounds normal. No respiratory distress.   Abdominal: Soft. Bowel sounds are normal. He exhibits no distension and no mass. There  is no hepatosplenomegaly. There is no tenderness. There is no rebound and no guarding. No hernia.   Musculoskeletal: He exhibits no edema.   Neurological: He is alert and oriented to person, place, and time. Coordination and gait normal.   Skin: Skin is warm and dry.   Psychiatric: His speech is normal and behavior is normal. Judgment and thought content normal. His mood appears anxious. He exhibits a depressed mood.   Nursing note and vitals reviewed.      Assessment/Plan   Diagnoses and all orders for this visit:    Medicare annual wellness visit, subsequent  -     Hemoglobin A1c  -     Lipid Panel With LDL / HDL Ratio  -     PSA  -     CBC & Differential  -     Comprehensive Metabolic Panel  -     T4, Free  -     TSH  -     Urinalysis With Microscopic  -     C-reactive Protein  -     MicroAlbumin, Urine, Random  -     Uric Acid    Other depression  -     Hemoglobin A1c  -     Lipid Panel With LDL / HDL Ratio  -     PSA  -     CBC & Differential  -     Comprehensive Metabolic Panel  -     T4, Free  -     TSH  -     Urinalysis With Microscopic  -     C-reactive Protein  -     MicroAlbumin, Urine, Random  -     Uric Acid    Alcohol dependence in remission  -     Hemoglobin A1c  -     Lipid Panel With LDL / HDL Ratio  -     PSA  -     CBC & Differential  -     Comprehensive Metabolic Panel  -     T4, Free  -     TSH  -     Urinalysis With Microscopic  -     C-reactive Protein  -     MicroAlbumin, Urine, Random  -     Uric Acid    Essential hypertension  -     Hemoglobin A1c  -     Lipid Panel With LDL / HDL Ratio  -     PSA  -     CBC & Differential  -     Comprehensive Metabolic Panel  -     T4, Free  -     TSH  -     Urinalysis With Microscopic  -     C-reactive Protein  -     MicroAlbumin, Urine, Random  -     Uric Acid    Arthralgia of multiple joints  -     Hemoglobin A1c  -     Lipid Panel With LDL / HDL Ratio  -     PSA  -     CBC & Differential  -     Comprehensive Metabolic Panel  -     T4, Free  -      TSH  -     Urinalysis With Microscopic  -     C-reactive Protein  -     MicroAlbumin, Urine, Random  -     Uric Acid    Idiopathic chronic gout of multiple sites without tophus  -     Hemoglobin A1c  -     Lipid Panel With LDL / HDL Ratio  -     PSA  -     CBC & Differential  -     Comprehensive Metabolic Panel  -     T4, Free  -     TSH  -     Urinalysis With Microscopic  -     C-reactive Protein  -     MicroAlbumin, Urine, Random  -     Uric Acid    Generalized osteoarthritis of multiple sites  -     Hemoglobin A1c  -     Lipid Panel With LDL / HDL Ratio  -     PSA  -     CBC & Differential  -     Comprehensive Metabolic Panel  -     T4, Free  -     TSH  -     Urinalysis With Microscopic  -     C-reactive Protein  -     MicroAlbumin, Urine, Random  -     Uric Acid    Controlled substance agreement signed  -     Hemoglobin A1c  -     Lipid Panel With LDL / HDL Ratio  -     PSA  -     CBC & Differential  -     Comprehensive Metabolic Panel  -     T4, Free  -     TSH  -     Urinalysis With Microscopic  -     C-reactive Protein  -     MicroAlbumin, Urine, Random  -     Uric Acid    Night sweats  -     Hemoglobin A1c  -     Lipid Panel With LDL / HDL Ratio  -     PSA  -     CBC & Differential  -     Comprehensive Metabolic Panel  -     T4, Free  -     TSH  -     Urinalysis With Microscopic  -     C-reactive Protein  -     MicroAlbumin, Urine, Random  -     Uric Acid    Nocturia  -     Hemoglobin A1c  -     Lipid Panel With LDL / HDL Ratio  -     PSA  -     CBC & Differential  -     Comprehensive Metabolic Panel  -     T4, Free  -     TSH  -     Urinalysis With Microscopic  -     C-reactive Protein  -     MicroAlbumin, Urine, Random  -     Uric Acid    Pain  -     HYDROcodone-acetaminophen (NORCO) 7.5-325 MG per tablet; Take 1 tablet by mouth Every 6 (Six) Hours As Needed for Severe Pain (7-10).    Other orders  -     Manual Differential  -     Microscopic Examination      Medicare annual wellness visit initial  following recommendation  Nocturia supportive meds  Pain supportive meds for this as well  Night sweats follow with C-reactive protein  Control substance agreement signed today for pain meds  Idiopathic chronic gout stable  Generalized osteoarthritis of multiple sites supportive meds physical therapy  Arthralgia or joints noted  Hypertension we'll controlled on current medication normally  Alcohol dependence in remission stable  Depression stable on current medication

## 2018-01-01 ENCOUNTER — TELEPHONE (OUTPATIENT)
Dept: CARDIOLOGY | Facility: CLINIC | Age: 73
End: 2018-01-01

## 2018-01-01 ENCOUNTER — HOSPITAL ENCOUNTER (OUTPATIENT)
Dept: CARDIOLOGY | Facility: HOSPITAL | Age: 73
Setting detail: RECURRING SERIES
Discharge: HOME | End: 2018-03-20

## 2018-01-01 ENCOUNTER — OFFICE VISIT (OUTPATIENT)
Dept: CARDIOLOGY | Facility: CLINIC | Age: 73
End: 2018-01-01

## 2018-01-01 ENCOUNTER — HOSPITAL ENCOUNTER (OUTPATIENT)
Dept: CARDIOLOGY | Facility: HOSPITAL | Age: 73
Setting detail: RECURRING SERIES
Discharge: HOME | End: 2018-05-31

## 2018-01-01 ENCOUNTER — HOSPITAL ENCOUNTER (OUTPATIENT)
Dept: CARDIOLOGY | Facility: HOSPITAL | Age: 73
Setting detail: RECURRING SERIES
Discharge: HOME | End: 2018-06-08

## 2018-01-01 ENCOUNTER — HOSPITAL ENCOUNTER (OUTPATIENT)
Dept: CARDIOLOGY | Facility: HOSPITAL | Age: 73
Setting detail: RECURRING SERIES
Discharge: HOME | End: 2018-03-30

## 2018-01-01 ENCOUNTER — HOSPITAL ENCOUNTER (OUTPATIENT)
Dept: CARDIOLOGY | Facility: HOSPITAL | Age: 73
Discharge: HOME | End: 2018-02-08
Admitting: INTERNAL MEDICINE

## 2018-01-01 ENCOUNTER — HOSPITAL ENCOUNTER (OUTPATIENT)
Dept: CARDIOLOGY | Facility: HOSPITAL | Age: 73
Setting detail: RECURRING SERIES
Discharge: HOME | End: 2018-04-06

## 2018-01-01 ENCOUNTER — HOSPITAL ENCOUNTER (OUTPATIENT)
Dept: CARDIOLOGY | Facility: HOSPITAL | Age: 73
Setting detail: RECURRING SERIES
Discharge: HOME | End: 2018-03-23

## 2018-01-01 ENCOUNTER — EPISODE CHANGES (OUTPATIENT)
Dept: CASE MANAGEMENT | Facility: OTHER | Age: 73
End: 2018-01-01

## 2018-01-01 ENCOUNTER — OFFICE VISIT (OUTPATIENT)
Dept: INTERNAL MEDICINE | Facility: CLINIC | Age: 73
End: 2018-01-01

## 2018-01-01 ENCOUNTER — HOSPITAL ENCOUNTER (OUTPATIENT)
Dept: CARDIOLOGY | Facility: HOSPITAL | Age: 73
Setting detail: RECURRING SERIES
Discharge: HOME | End: 2018-06-22

## 2018-01-01 ENCOUNTER — TELEPHONE (OUTPATIENT)
Dept: SOCIAL WORK | Facility: HOSPITAL | Age: 73
End: 2018-01-01

## 2018-01-01 ENCOUNTER — HOSPITAL ENCOUNTER (OUTPATIENT)
Dept: CARDIOLOGY | Facility: HOSPITAL | Age: 73
Setting detail: RECURRING SERIES
Discharge: HOME | End: 2018-05-17

## 2018-01-01 ENCOUNTER — HOSPITAL ENCOUNTER (OUTPATIENT)
Dept: CARDIOLOGY | Facility: HOSPITAL | Age: 73
Setting detail: RECURRING SERIES
Discharge: HOME | End: 2018-04-20

## 2018-01-01 ENCOUNTER — LAB (OUTPATIENT)
Dept: LAB | Facility: HOSPITAL | Age: 73
End: 2018-01-01

## 2018-01-01 ENCOUNTER — APPOINTMENT (OUTPATIENT)
Dept: RESPIRATORY THERAPY | Facility: HOSPITAL | Age: 73
End: 2018-01-01
Attending: INTERNAL MEDICINE

## 2018-01-01 ENCOUNTER — HOSPITAL ENCOUNTER (INPATIENT)
Facility: HOSPITAL | Age: 73
LOS: 6 days | Discharge: HOME | End: 2018-03-18
Attending: INTERNAL MEDICINE | Admitting: INTERNAL MEDICINE

## 2018-01-01 ENCOUNTER — HOSPITAL ENCOUNTER (OUTPATIENT)
Dept: CARDIOLOGY | Facility: HOSPITAL | Age: 73
Setting detail: RECURRING SERIES
Discharge: HOME | End: 2018-04-13

## 2018-01-01 ENCOUNTER — HOSPITAL ENCOUNTER (OUTPATIENT)
Dept: CARDIOLOGY | Facility: HOSPITAL | Age: 73
Discharge: HOME | End: 2018-06-06
Admitting: NURSE PRACTITIONER

## 2018-01-01 ENCOUNTER — HOSPITAL ENCOUNTER (OUTPATIENT)
Dept: CARDIOLOGY | Facility: HOSPITAL | Age: 73
Discharge: HOME | End: 2018-02-08
Attending: INTERNAL MEDICINE

## 2018-01-01 ENCOUNTER — HOSPITAL ENCOUNTER (OUTPATIENT)
Dept: CARDIOLOGY | Facility: HOSPITAL | Age: 73
Setting detail: RECURRING SERIES
Discharge: HOME | End: 2018-05-10

## 2018-01-01 ENCOUNTER — APPOINTMENT (OUTPATIENT)
Dept: SLEEP MEDICINE | Facility: HOSPITAL | Age: 73
End: 2018-01-01
Attending: INTERNAL MEDICINE

## 2018-01-01 ENCOUNTER — TREATMENT (OUTPATIENT)
Dept: PHYSICAL THERAPY | Facility: CLINIC | Age: 73
End: 2018-01-01

## 2018-01-01 ENCOUNTER — CLINICAL SUPPORT (OUTPATIENT)
Dept: CARDIOLOGY | Facility: CLINIC | Age: 73
End: 2018-01-01

## 2018-01-01 ENCOUNTER — TELEPHONE (OUTPATIENT)
Dept: CARDIOLOGY | Facility: HOSPITAL | Age: 73
End: 2018-01-01

## 2018-01-01 ENCOUNTER — HOSPITAL ENCOUNTER (OUTPATIENT)
Dept: CARDIOLOGY | Facility: HOSPITAL | Age: 73
Setting detail: RECURRING SERIES
Discharge: HOME | End: 2018-04-26

## 2018-01-01 ENCOUNTER — HOSPITAL ENCOUNTER (EMERGENCY)
Facility: HOSPITAL | Age: 73
Discharge: HOME | End: 2018-02-11
Attending: EMERGENCY MEDICINE | Admitting: EMERGENCY MEDICINE

## 2018-01-01 ENCOUNTER — APPOINTMENT (OUTPATIENT)
Dept: GENERAL RADIOLOGY | Facility: HOSPITAL | Age: 73
End: 2018-01-01

## 2018-01-01 VITALS
SYSTOLIC BLOOD PRESSURE: 130 MMHG | HEART RATE: 134 BPM | HEIGHT: 68 IN | DIASTOLIC BLOOD PRESSURE: 80 MMHG | BODY MASS INDEX: 39.25 KG/M2 | WEIGHT: 259 LBS

## 2018-01-01 VITALS
HEART RATE: 124 BPM | OXYGEN SATURATION: 91 % | HEIGHT: 68 IN | SYSTOLIC BLOOD PRESSURE: 124 MMHG | WEIGHT: 246 LBS | DIASTOLIC BLOOD PRESSURE: 90 MMHG | BODY MASS INDEX: 37.28 KG/M2

## 2018-01-01 VITALS
WEIGHT: 213.25 LBS | HEIGHT: 68 IN | RESPIRATION RATE: 20 BRPM | OXYGEN SATURATION: 92 % | TEMPERATURE: 98 F | BODY MASS INDEX: 32.32 KG/M2 | SYSTOLIC BLOOD PRESSURE: 108 MMHG | DIASTOLIC BLOOD PRESSURE: 97 MMHG | HEART RATE: 81 BPM

## 2018-01-01 VITALS
BODY MASS INDEX: 35.77 KG/M2 | WEIGHT: 236 LBS | HEIGHT: 68 IN | DIASTOLIC BLOOD PRESSURE: 72 MMHG | HEART RATE: 49 BPM | SYSTOLIC BLOOD PRESSURE: 138 MMHG

## 2018-01-01 VITALS
HEIGHT: 68 IN | TEMPERATURE: 98.1 F | HEART RATE: 96 BPM | BODY MASS INDEX: 36.43 KG/M2 | WEIGHT: 240.4 LBS | OXYGEN SATURATION: 90 % | DIASTOLIC BLOOD PRESSURE: 82 MMHG | SYSTOLIC BLOOD PRESSURE: 162 MMHG

## 2018-01-01 VITALS
OXYGEN SATURATION: 95 % | SYSTOLIC BLOOD PRESSURE: 133 MMHG | BODY MASS INDEX: 37.89 KG/M2 | WEIGHT: 250 LBS | HEART RATE: 67 BPM | TEMPERATURE: 98.4 F | DIASTOLIC BLOOD PRESSURE: 99 MMHG | RESPIRATION RATE: 16 BRPM | HEIGHT: 68 IN

## 2018-01-01 VITALS
SYSTOLIC BLOOD PRESSURE: 110 MMHG | HEART RATE: 38 BPM | WEIGHT: 233.8 LBS | DIASTOLIC BLOOD PRESSURE: 72 MMHG | BODY MASS INDEX: 35.43 KG/M2 | HEIGHT: 68 IN

## 2018-01-01 VITALS
BODY MASS INDEX: 39.25 KG/M2 | WEIGHT: 259 LBS | DIASTOLIC BLOOD PRESSURE: 80 MMHG | HEIGHT: 68 IN | SYSTOLIC BLOOD PRESSURE: 130 MMHG | HEART RATE: 136 BPM

## 2018-01-01 VITALS
SYSTOLIC BLOOD PRESSURE: 130 MMHG | OXYGEN SATURATION: 92 % | HEIGHT: 68 IN | RESPIRATION RATE: 18 BRPM | DIASTOLIC BLOOD PRESSURE: 100 MMHG | WEIGHT: 240 LBS | HEART RATE: 140 BPM | BODY MASS INDEX: 36.37 KG/M2

## 2018-01-01 DIAGNOSIS — H81.10 BPPV (BENIGN PAROXYSMAL POSITIONAL VERTIGO), UNSPECIFIED LATERALITY: ICD-10-CM

## 2018-01-01 DIAGNOSIS — I51.9 LEFT VENTRICULAR DYSFUNCTION: ICD-10-CM

## 2018-01-01 DIAGNOSIS — I50.43 ACUTE ON CHRONIC SYSTOLIC AND DIASTOLIC HEART FAILURE, NYHA CLASS 3 (HCC): ICD-10-CM

## 2018-01-01 DIAGNOSIS — R06.02 SHORTNESS OF BREATH: ICD-10-CM

## 2018-01-01 DIAGNOSIS — I48.0 PAROXYSMAL ATRIAL FIBRILLATION (HCC): ICD-10-CM

## 2018-01-01 DIAGNOSIS — F17.290 CIGAR SMOKER: ICD-10-CM

## 2018-01-01 DIAGNOSIS — F32.89 OTHER DEPRESSION: ICD-10-CM

## 2018-01-01 DIAGNOSIS — R31.0 GROSS HEMATURIA: ICD-10-CM

## 2018-01-01 DIAGNOSIS — M15.9 GENERALIZED OSTEOARTHRITIS OF MULTIPLE SITES: ICD-10-CM

## 2018-01-01 DIAGNOSIS — G47.33 OSA (OBSTRUCTIVE SLEEP APNEA): Primary | ICD-10-CM

## 2018-01-01 DIAGNOSIS — I10 ESSENTIAL HYPERTENSION: ICD-10-CM

## 2018-01-01 DIAGNOSIS — R29.818 SUSPECTED SLEEP APNEA: ICD-10-CM

## 2018-01-01 DIAGNOSIS — I48.92 ATRIAL FLUTTER, UNSPECIFIED TYPE (HCC): ICD-10-CM

## 2018-01-01 DIAGNOSIS — R52 PAIN: ICD-10-CM

## 2018-01-01 DIAGNOSIS — R00.1 SINUS BRADYCARDIA: ICD-10-CM

## 2018-01-01 DIAGNOSIS — R06.09 DYSPNEA ON EXERTION: ICD-10-CM

## 2018-01-01 DIAGNOSIS — R53.83 OTHER FATIGUE: ICD-10-CM

## 2018-01-01 DIAGNOSIS — I48.4 ATYPICAL ATRIAL FLUTTER (HCC): Primary | ICD-10-CM

## 2018-01-01 DIAGNOSIS — I10 ESSENTIAL HYPERTENSION: Primary | ICD-10-CM

## 2018-01-01 DIAGNOSIS — R06.09 DYSPNEA ON EXERTION: Primary | ICD-10-CM

## 2018-01-01 DIAGNOSIS — R06.09 DOE (DYSPNEA ON EXERTION): ICD-10-CM

## 2018-01-01 DIAGNOSIS — R04.0 ACUTE ANTERIOR EPISTAXIS: Primary | ICD-10-CM

## 2018-01-01 DIAGNOSIS — F10.21 ALCOHOL DEPENDENCE IN REMISSION (HCC): ICD-10-CM

## 2018-01-01 DIAGNOSIS — I48.92 ATRIAL FLUTTER, UNSPECIFIED TYPE (HCC): Primary | ICD-10-CM

## 2018-01-01 DIAGNOSIS — I48.91 NEW ONSET A-FIB (HCC): Primary | ICD-10-CM

## 2018-01-01 DIAGNOSIS — E78.5 HYPERLIPIDEMIA, UNSPECIFIED HYPERLIPIDEMIA TYPE: Primary | ICD-10-CM

## 2018-01-01 DIAGNOSIS — Z87.898 H/O DIZZINESS: Primary | ICD-10-CM

## 2018-01-01 DIAGNOSIS — I48.91 ATRIAL FIBRILLATION, UNSPECIFIED TYPE (HCC): ICD-10-CM

## 2018-01-01 DIAGNOSIS — M1A.09X0 IDIOPATHIC CHRONIC GOUT OF MULTIPLE SITES WITHOUT TOPHUS: ICD-10-CM

## 2018-01-01 DIAGNOSIS — G47.34 NOCTURNAL HYPOXEMIA: ICD-10-CM

## 2018-01-01 DIAGNOSIS — R00.1 SINUS BRADYCARDIA: Primary | ICD-10-CM

## 2018-01-01 DIAGNOSIS — I48.91 ATRIAL FIBRILLATION WITH RVR (HCC): Primary | ICD-10-CM

## 2018-01-01 DIAGNOSIS — I48.91 NEW ONSET A-FIB (HCC): ICD-10-CM

## 2018-01-01 DIAGNOSIS — Z48.00 ENCOUNTER FOR REMOVAL OF NASAL PACKING: Primary | ICD-10-CM

## 2018-01-01 DIAGNOSIS — H93.8X3 EAR CONGESTION, BILATERAL: ICD-10-CM

## 2018-01-01 DIAGNOSIS — H81.13 BENIGN PAROXYSMAL POSITIONAL VERTIGO DUE TO BILATERAL VESTIBULAR DISORDER: ICD-10-CM

## 2018-01-01 LAB
ALBUMIN SERPL-MCNC: 3 G/DL (ref 3.5–5.2)
ALBUMIN SERPL-MCNC: 3.3 G/DL (ref 3.5–5.2)
ALBUMIN SERPL-MCNC: 3.5 G/DL (ref 3.5–5.2)
ALBUMIN/GLOB SERPL: 0.9 G/DL
ALBUMIN/GLOB SERPL: 1.1 G/DL
ALBUMIN/GLOB SERPL: 1.2 G/DL
ALP SERPL-CCNC: 60 U/L (ref 39–117)
ALP SERPL-CCNC: 63 U/L (ref 39–117)
ALP SERPL-CCNC: 67 U/L (ref 39–117)
ALT SERPL W P-5'-P-CCNC: 12 U/L (ref 1–41)
ALT SERPL W P-5'-P-CCNC: 18 U/L (ref 1–41)
ALT SERPL W P-5'-P-CCNC: 8 U/L (ref 1–41)
ANION GAP SERPL CALCULATED.3IONS-SCNC: 10.1 MMOL/L
ANION GAP SERPL CALCULATED.3IONS-SCNC: 10.5 MMOL/L
ANION GAP SERPL CALCULATED.3IONS-SCNC: 10.7 MMOL/L
ANION GAP SERPL CALCULATED.3IONS-SCNC: 11.2 MMOL/L
ANION GAP SERPL CALCULATED.3IONS-SCNC: 11.8 MMOL/L
ANION GAP SERPL CALCULATED.3IONS-SCNC: 12.3 MMOL/L
ANION GAP SERPL CALCULATED.3IONS-SCNC: 12.6 MMOL/L
ANION GAP SERPL CALCULATED.3IONS-SCNC: 7.5 MMOL/L
ANION GAP SERPL CALCULATED.3IONS-SCNC: 7.7 MMOL/L
ANION GAP SERPL CALCULATED.3IONS-SCNC: 9.8 MMOL/L
ASCENDING AORTA: 3.6 CM
AST SERPL-CCNC: 14 U/L (ref 1–40)
AST SERPL-CCNC: 19 U/L (ref 1–40)
AST SERPL-CCNC: 19 U/L (ref 1–40)
BASOPHILS # BLD AUTO: 0.02 10*3/MM3 (ref 0–0.2)
BASOPHILS # BLD AUTO: 0.02 10*3/MM3 (ref 0–0.2)
BASOPHILS # BLD AUTO: 0.03 10*3/MM3 (ref 0–0.2)
BASOPHILS NFR BLD AUTO: 0.2 % (ref 0–1.5)
BASOPHILS NFR BLD AUTO: 0.3 % (ref 0–1.5)
BASOPHILS NFR BLD AUTO: 0.4 % (ref 0–1.5)
BH CV ECHO MEAS - ACS: 2.2 CM
BH CV ECHO MEAS - AO MAX PG (FULL): 3.4 MMHG
BH CV ECHO MEAS - AO MAX PG: 5.8 MMHG
BH CV ECHO MEAS - AO MEAN PG (FULL): 1.8 MMHG
BH CV ECHO MEAS - AO MEAN PG: 3.2 MMHG
BH CV ECHO MEAS - AO ROOT AREA (BSA CORRECTED): 1.6
BH CV ECHO MEAS - AO ROOT AREA: 9.9 CM^2
BH CV ECHO MEAS - AO ROOT DIAM: 3.6 CM
BH CV ECHO MEAS - AO V2 MAX: 120.8 CM/SEC
BH CV ECHO MEAS - AO V2 MEAN: 82.7 CM/SEC
BH CV ECHO MEAS - AO V2 VTI: 21.3 CM
BH CV ECHO MEAS - AVA(I,A): 2 CM^2
BH CV ECHO MEAS - AVA(I,D): 2 CM^2
BH CV ECHO MEAS - AVA(V,A): 2.2 CM^2
BH CV ECHO MEAS - AVA(V,D): 2.2 CM^2
BH CV ECHO MEAS - BSA(HAYCOCK): 2.3 M^2
BH CV ECHO MEAS - BSA: 2.2 M^2
BH CV ECHO MEAS - BZI_BMI: 37.6 KILOGRAMS/M^2
BH CV ECHO MEAS - BZI_METRIC_HEIGHT: 170.2 CM
BH CV ECHO MEAS - BZI_METRIC_WEIGHT: 108.9 KG
BH CV ECHO MEAS - CONTRAST EF (2CH): 45.8 ML/M^2
BH CV ECHO MEAS - CONTRAST EF 4CH: 35.3 ML/M^2
BH CV ECHO MEAS - EDV(MOD-SP2): 120 ML
BH CV ECHO MEAS - EDV(MOD-SP4): 102 ML
BH CV ECHO MEAS - EDV(TEICH): 97.8 ML
BH CV ECHO MEAS - EF(CUBED): 50.7 %
BH CV ECHO MEAS - EF(MOD-SP2): 45.8 %
BH CV ECHO MEAS - EF(MOD-SP4): 41 %
BH CV ECHO MEAS - EF(TEICH): 42.8 %
BH CV ECHO MEAS - ESV(MOD-SP2): 65 ML
BH CV ECHO MEAS - ESV(MOD-SP4): 66 ML
BH CV ECHO MEAS - ESV(TEICH): 55.9 ML
BH CV ECHO MEAS - FS: 21 %
BH CV ECHO MEAS - IVS/LVPW: 1.1
BH CV ECHO MEAS - IVSD: 1.4 CM
BH CV ECHO MEAS - LAT PEAK E' VEL: 10 CM/SEC
BH CV ECHO MEAS - LV DIASTOLIC VOL/BSA (35-75): 46.7 ML/M^2
BH CV ECHO MEAS - LV MASS(C)D: 234.4 GRAMS
BH CV ECHO MEAS - LV MASS(C)DI: 107.3 GRAMS/M^2
BH CV ECHO MEAS - LV MAX PG: 2.4 MMHG
BH CV ECHO MEAS - LV MEAN PG: 1.3 MMHG
BH CV ECHO MEAS - LV SYSTOLIC VOL/BSA (12-30): 30.2 ML/M^2
BH CV ECHO MEAS - LV V1 MAX: 77.6 CM/SEC
BH CV ECHO MEAS - LV V1 MEAN: 52 CM/SEC
BH CV ECHO MEAS - LV V1 VTI: 12.1 CM
BH CV ECHO MEAS - LVIDD: 4.6 CM
BH CV ECHO MEAS - LVIDS: 3.6 CM
BH CV ECHO MEAS - LVLD AP2: 7.5 CM
BH CV ECHO MEAS - LVLD AP4: 7.3 CM
BH CV ECHO MEAS - LVLS AP2: 7.3 CM
BH CV ECHO MEAS - LVLS AP4: 7.4 CM
BH CV ECHO MEAS - LVOT AREA (M): 3.5 CM^2
BH CV ECHO MEAS - LVOT AREA: 3.4 CM^2
BH CV ECHO MEAS - LVOT DIAM: 2.1 CM
BH CV ECHO MEAS - LVPWD: 1.2 CM
BH CV ECHO MEAS - MED PEAK E' VEL: 16 CM/SEC
BH CV ECHO MEAS - MR MAX PG: 47.2 MMHG
BH CV ECHO MEAS - MR MAX VEL: 343.4 CM/SEC
BH CV ECHO MEAS - MV A DUR: 0.07 SEC
BH CV ECHO MEAS - MV A MAX VEL: 30.6 CM/SEC
BH CV ECHO MEAS - MV DEC SLOPE: 537.6 CM/SEC^2
BH CV ECHO MEAS - MV DEC TIME: 0.14 SEC
BH CV ECHO MEAS - MV E MAX VEL: 79.1 CM/SEC
BH CV ECHO MEAS - MV E/A: 2.6
BH CV ECHO MEAS - MV MAX PG: 4.5 MMHG
BH CV ECHO MEAS - MV MEAN PG: 2.1 MMHG
BH CV ECHO MEAS - MV P1/2T MAX VEL: 79.5 CM/SEC
BH CV ECHO MEAS - MV P1/2T: 43.3 MSEC
BH CV ECHO MEAS - MV V2 MAX: 105.9 CM/SEC
BH CV ECHO MEAS - MV V2 MEAN: 69.5 CM/SEC
BH CV ECHO MEAS - MV V2 VTI: 22.3 CM
BH CV ECHO MEAS - MVA P1/2T LCG: 2.8 CM^2
BH CV ECHO MEAS - MVA(P1/2T): 5.1 CM^2
BH CV ECHO MEAS - MVA(VTI): 1.9 CM^2
BH CV ECHO MEAS - PA ACC TIME: 0.08 SEC
BH CV ECHO MEAS - PA MAX PG (FULL): 1 MMHG
BH CV ECHO MEAS - PA MAX PG: 1.9 MMHG
BH CV ECHO MEAS - PA PR(ACCEL): 44.1 MMHG
BH CV ECHO MEAS - PA V2 MAX: 69.2 CM/SEC
BH CV ECHO MEAS - PVA(V,A): 3 CM^2
BH CV ECHO MEAS - PVA(V,D): 3 CM^2
BH CV ECHO MEAS - QP/QS: 0.78
BH CV ECHO MEAS - RAP SYSTOLE: 3 MMHG
BH CV ECHO MEAS - RV MAX PG: 0.87 MMHG
BH CV ECHO MEAS - RV MEAN PG: 0.41 MMHG
BH CV ECHO MEAS - RV V1 MAX: 46.6 CM/SEC
BH CV ECHO MEAS - RV V1 MEAN: 29.1 CM/SEC
BH CV ECHO MEAS - RV V1 VTI: 7.4 CM
BH CV ECHO MEAS - RVOT AREA: 4.4 CM^2
BH CV ECHO MEAS - RVOT DIAM: 2.4 CM
BH CV ECHO MEAS - RVSP: 19 MMHG
BH CV ECHO MEAS - SI(AO): 96.5 ML/M^2
BH CV ECHO MEAS - SI(CUBED): 22.7 ML/M^2
BH CV ECHO MEAS - SI(LVOT): 19.1 ML/M^2
BH CV ECHO MEAS - SI(MOD-SP2): 25.2 ML/M^2
BH CV ECHO MEAS - SI(MOD-SP4): 16.5 ML/M^2
BH CV ECHO MEAS - SI(TEICH): 19.2 ML/M^2
BH CV ECHO MEAS - SUP REN AO DIAM: 1.9 CM
BH CV ECHO MEAS - SV(AO): 210.8 ML
BH CV ECHO MEAS - SV(CUBED): 49.6 ML
BH CV ECHO MEAS - SV(LVOT): 41.6 ML
BH CV ECHO MEAS - SV(MOD-SP2): 55 ML
BH CV ECHO MEAS - SV(MOD-SP4): 36 ML
BH CV ECHO MEAS - SV(RVOT): 32.6 ML
BH CV ECHO MEAS - SV(TEICH): 41.9 ML
BH CV ECHO MEAS - TAPSE (>1.6): 1.5 CM2
BH CV ECHO MEAS - TR MAX VEL: 196.8 CM/SEC
BH CV NUCLEAR PRIOR STUDY: 2
BH CV STRESS BP STAGE 1: NORMAL
BH CV STRESS COMMENTS STAGE 1: NORMAL
BH CV STRESS DOSE REGADENOSON STAGE 1: 0.4
BH CV STRESS DURATION MIN STAGE 1: 0
BH CV STRESS DURATION SEC STAGE 1: 10
BH CV STRESS HR STAGE 1: 132
BH CV STRESS PROTOCOL 1: NORMAL
BH CV STRESS RECOVERY BP: NORMAL MMHG
BH CV STRESS RECOVERY HR: 132 BPM
BH CV STRESS STAGE 1: 1
BH CV XLRA - RV BASE: 3.5 CM
BH CV XLRA - TDI S': 9 CM/SEC
BILIRUB SERPL-MCNC: 1 MG/DL (ref 0.1–1.2)
BILIRUB SERPL-MCNC: 1.1 MG/DL (ref 0.1–1.2)
BILIRUB SERPL-MCNC: 1.3 MG/DL (ref 0.1–1.2)
BUN BLD-MCNC: 16 MG/DL (ref 8–23)
BUN BLD-MCNC: 17 MG/DL (ref 8–23)
BUN BLD-MCNC: 18 MG/DL (ref 8–23)
BUN BLD-MCNC: 20 MG/DL (ref 8–23)
BUN BLD-MCNC: 24 MG/DL (ref 8–23)
BUN BLD-MCNC: 26 MG/DL (ref 8–23)
BUN BLD-MCNC: 30 MG/DL (ref 8–23)
BUN BLD-MCNC: 31 MG/DL (ref 8–23)
BUN/CREAT SERPL: 18.1 (ref 7–25)
BUN/CREAT SERPL: 20.2 (ref 7–25)
BUN/CREAT SERPL: 20.8 (ref 7–25)
BUN/CREAT SERPL: 20.9 (ref 7–25)
BUN/CREAT SERPL: 21.3 (ref 7–25)
BUN/CREAT SERPL: 22.5 (ref 7–25)
BUN/CREAT SERPL: 25.5 (ref 7–25)
BUN/CREAT SERPL: 27.1 (ref 7–25)
BUN/CREAT SERPL: 29.7 (ref 7–25)
BUN/CREAT SERPL: 35.2 (ref 7–25)
CALCIUM SPEC-SCNC: 8.1 MG/DL (ref 8.6–10.5)
CALCIUM SPEC-SCNC: 8.2 MG/DL (ref 8.6–10.5)
CALCIUM SPEC-SCNC: 8.3 MG/DL (ref 8.6–10.5)
CALCIUM SPEC-SCNC: 8.4 MG/DL (ref 8.6–10.5)
CALCIUM SPEC-SCNC: 8.5 MG/DL (ref 8.6–10.5)
CALCIUM SPEC-SCNC: 8.5 MG/DL (ref 8.6–10.5)
CALCIUM SPEC-SCNC: 8.6 MG/DL (ref 8.6–10.5)
CALCIUM SPEC-SCNC: 8.7 MG/DL (ref 8.6–10.5)
CHLORIDE SERPL-SCNC: 100 MMOL/L (ref 98–107)
CHLORIDE SERPL-SCNC: 102 MMOL/L (ref 98–107)
CHLORIDE SERPL-SCNC: 103 MMOL/L (ref 98–107)
CHLORIDE SERPL-SCNC: 105 MMOL/L (ref 98–107)
CHLORIDE SERPL-SCNC: 89 MMOL/L (ref 98–107)
CHLORIDE SERPL-SCNC: 90 MMOL/L (ref 98–107)
CHLORIDE SERPL-SCNC: 93 MMOL/L (ref 98–107)
CHLORIDE SERPL-SCNC: 94 MMOL/L (ref 98–107)
CHLORIDE SERPL-SCNC: 98 MMOL/L (ref 98–107)
CHLORIDE SERPL-SCNC: 99 MMOL/L (ref 98–107)
CO2 SERPL-SCNC: 25.7 MMOL/L (ref 22–29)
CO2 SERPL-SCNC: 29.4 MMOL/L (ref 22–29)
CO2 SERPL-SCNC: 31.2 MMOL/L (ref 22–29)
CO2 SERPL-SCNC: 32.2 MMOL/L (ref 22–29)
CO2 SERPL-SCNC: 33.5 MMOL/L (ref 22–29)
CO2 SERPL-SCNC: 34.3 MMOL/L (ref 22–29)
CO2 SERPL-SCNC: 35.9 MMOL/L (ref 22–29)
CO2 SERPL-SCNC: 38.8 MMOL/L (ref 22–29)
CO2 SERPL-SCNC: 39.5 MMOL/L (ref 22–29)
CO2 SERPL-SCNC: 42.3 MMOL/L (ref 22–29)
CREAT BLD-MCNC: 0.75 MG/DL (ref 0.76–1.27)
CREAT BLD-MCNC: 0.8 MG/DL (ref 0.76–1.27)
CREAT BLD-MCNC: 0.86 MG/DL (ref 0.76–1.27)
CREAT BLD-MCNC: 0.88 MG/DL (ref 0.76–1.27)
CREAT BLD-MCNC: 0.89 MG/DL (ref 0.76–1.27)
CREAT BLD-MCNC: 0.94 MG/DL (ref 0.76–1.27)
CREAT BLD-MCNC: 0.94 MG/DL (ref 0.76–1.27)
CREAT BLD-MCNC: 0.96 MG/DL (ref 0.76–1.27)
CREAT BLD-MCNC: 0.96 MG/DL (ref 0.76–1.27)
CREAT BLD-MCNC: 1.01 MG/DL (ref 0.76–1.27)
D DIMER PPP FEU-MCNC: 0.65 MCGFEU/ML (ref 0–0.49)
DEPRECATED RDW RBC AUTO: 53.8 FL (ref 37–54)
DEPRECATED RDW RBC AUTO: 60.9 FL (ref 37–54)
DEPRECATED RDW RBC AUTO: 61.1 FL (ref 37–54)
DEPRECATED RDW RBC AUTO: 63 FL (ref 37–54)
E/E' RATIO: 13
EOSINOPHIL # BLD AUTO: 0.21 10*3/MM3 (ref 0–0.7)
EOSINOPHIL # BLD AUTO: 0.23 10*3/MM3 (ref 0–0.7)
EOSINOPHIL # BLD AUTO: 0.24 10*3/MM3 (ref 0–0.7)
EOSINOPHIL NFR BLD AUTO: 2.7 % (ref 0.3–6.2)
EOSINOPHIL NFR BLD AUTO: 2.8 % (ref 0.3–6.2)
EOSINOPHIL NFR BLD AUTO: 3 % (ref 0.3–6.2)
ERYTHROCYTE [DISTWIDTH] IN BLOOD BY AUTOMATED COUNT: 14 % (ref 11.5–14.5)
ERYTHROCYTE [DISTWIDTH] IN BLOOD BY AUTOMATED COUNT: 15.6 % (ref 11.5–14.5)
ERYTHROCYTE [DISTWIDTH] IN BLOOD BY AUTOMATED COUNT: 15.9 % (ref 11.5–14.5)
ERYTHROCYTE [DISTWIDTH] IN BLOOD BY AUTOMATED COUNT: 16.5 % (ref 11.5–14.5)
GFR SERPL CREATININE-BSD FRML MDRD: 103 ML/MIN/1.73
GFR SERPL CREATININE-BSD FRML MDRD: 73 ML/MIN/1.73
GFR SERPL CREATININE-BSD FRML MDRD: 77 ML/MIN/1.73
GFR SERPL CREATININE-BSD FRML MDRD: 77 ML/MIN/1.73
GFR SERPL CREATININE-BSD FRML MDRD: 79 ML/MIN/1.73
GFR SERPL CREATININE-BSD FRML MDRD: 79 ML/MIN/1.73
GFR SERPL CREATININE-BSD FRML MDRD: 84 ML/MIN/1.73
GFR SERPL CREATININE-BSD FRML MDRD: 85 ML/MIN/1.73
GFR SERPL CREATININE-BSD FRML MDRD: 88 ML/MIN/1.73
GFR SERPL CREATININE-BSD FRML MDRD: 95 ML/MIN/1.73
GLOBULIN UR ELPH-MCNC: 2.9 GM/DL
GLOBULIN UR ELPH-MCNC: 2.9 GM/DL
GLOBULIN UR ELPH-MCNC: 3.3 GM/DL
GLUCOSE BLD-MCNC: 103 MG/DL (ref 65–99)
GLUCOSE BLD-MCNC: 105 MG/DL (ref 65–99)
GLUCOSE BLD-MCNC: 109 MG/DL (ref 65–99)
GLUCOSE BLD-MCNC: 118 MG/DL (ref 65–99)
GLUCOSE BLD-MCNC: 120 MG/DL (ref 65–99)
GLUCOSE BLD-MCNC: 121 MG/DL (ref 65–99)
GLUCOSE BLD-MCNC: 126 MG/DL (ref 65–99)
GLUCOSE BLD-MCNC: 127 MG/DL (ref 65–99)
GLUCOSE BLD-MCNC: 139 MG/DL (ref 65–99)
GLUCOSE BLD-MCNC: 98 MG/DL (ref 65–99)
GLUCOSE BLDC GLUCOMTR-MCNC: 108 MG/DL (ref 70–130)
GLUCOSE BLDC GLUCOMTR-MCNC: 135 MG/DL (ref 70–130)
GLUCOSE BLDC GLUCOMTR-MCNC: 140 MG/DL (ref 70–130)
GLUCOSE BLDC GLUCOMTR-MCNC: 141 MG/DL (ref 70–130)
GLUCOSE BLDC GLUCOMTR-MCNC: 151 MG/DL (ref 70–130)
HCT VFR BLD AUTO: 41.8 % (ref 40.4–52.2)
HCT VFR BLD AUTO: 46 % (ref 40.4–52.2)
HCT VFR BLD AUTO: 46.2 % (ref 40.4–52.2)
HCT VFR BLD AUTO: 48.8 % (ref 40.4–52.2)
HCT VFR BLD AUTO: 48.8 % (ref 40.4–52.2)
HGB BLD-MCNC: 13.6 G/DL (ref 13.7–17.6)
HGB BLD-MCNC: 14.6 G/DL (ref 13.7–17.6)
HGB BLD-MCNC: 15.1 G/DL (ref 13.7–17.6)
HGB BLD-MCNC: 15.4 G/DL (ref 13.7–17.6)
HGB BLD-MCNC: 15.7 G/DL (ref 13.7–17.6)
IMM GRANULOCYTES # BLD: 0 10*3/MM3 (ref 0–0.03)
IMM GRANULOCYTES # BLD: 0.01 10*3/MM3 (ref 0–0.03)
IMM GRANULOCYTES NFR BLD: 0 % (ref 0–0.5)
IMM GRANULOCYTES NFR BLD: 0.1 % (ref 0–0.5)
INR PPP: 1.03 (ref 0.9–1.1)
INR PPP: 1.21 (ref 0.9–1.1)
INR PPP: 1.24 (ref 0.9–1.1)
INR PPP: 1.31 (ref 0.9–1.1)
INR PPP: 1.94 (ref 0.9–1.1)
INR PPP: 2.63 (ref 0.9–1.1)
LEFT ATRIUM VOLUME INDEX: 39 ML/M2
LV EF 2D ECHO EST: 41 %
LV EF NUC BP: 55 %
LYMPHOCYTES # BLD AUTO: 1.84 10*3/MM3 (ref 0.9–4.8)
LYMPHOCYTES # BLD AUTO: 1.95 10*3/MM3 (ref 0.9–4.8)
LYMPHOCYTES # BLD AUTO: 2.21 10*3/MM3 (ref 0.9–4.8)
LYMPHOCYTES NFR BLD AUTO: 23.1 % (ref 19.6–45.3)
LYMPHOCYTES NFR BLD AUTO: 25.1 % (ref 19.6–45.3)
LYMPHOCYTES NFR BLD AUTO: 27.2 % (ref 19.6–45.3)
MAGNESIUM SERPL-MCNC: 1.5 MG/DL (ref 1.6–2.4)
MAGNESIUM SERPL-MCNC: 1.7 MG/DL (ref 1.6–2.4)
MAXIMAL PREDICTED HEART RATE: 149 BPM
MAXIMAL PREDICTED HEART RATE: 149 BPM
MCH RBC QN AUTO: 33.5 PG (ref 27–32.7)
MCH RBC QN AUTO: 33.7 PG (ref 27–32.7)
MCH RBC QN AUTO: 33.9 PG (ref 27–32.7)
MCH RBC QN AUTO: 34.2 PG (ref 27–32.7)
MCHC RBC AUTO-ENTMCNC: 31.6 G/DL (ref 32.6–36.4)
MCHC RBC AUTO-ENTMCNC: 31.7 G/DL (ref 32.6–36.4)
MCHC RBC AUTO-ENTMCNC: 32.5 G/DL (ref 32.6–36.4)
MCHC RBC AUTO-ENTMCNC: 32.7 G/DL (ref 32.6–36.4)
MCV RBC AUTO: 104.2 FL (ref 79.8–96.2)
MCV RBC AUTO: 104.8 FL (ref 79.8–96.2)
MCV RBC AUTO: 105.5 FL (ref 79.8–96.2)
MCV RBC AUTO: 106.8 FL (ref 79.8–96.2)
MONOCYTES # BLD AUTO: 0.6 10*3/MM3 (ref 0.2–1.2)
MONOCYTES # BLD AUTO: 0.68 10*3/MM3 (ref 0.2–1.2)
MONOCYTES # BLD AUTO: 0.69 10*3/MM3 (ref 0.2–1.2)
MONOCYTES NFR BLD AUTO: 7.5 % (ref 5–12)
MONOCYTES NFR BLD AUTO: 8.5 % (ref 5–12)
MONOCYTES NFR BLD AUTO: 8.8 % (ref 5–12)
NEUTROPHILS # BLD AUTO: 4.91 10*3/MM3 (ref 1.9–8.1)
NEUTROPHILS # BLD AUTO: 4.97 10*3/MM3 (ref 1.9–8.1)
NEUTROPHILS # BLD AUTO: 5.26 10*3/MM3 (ref 1.9–8.1)
NEUTROPHILS NFR BLD AUTO: 61.3 % (ref 42.7–76)
NEUTROPHILS NFR BLD AUTO: 63.1 % (ref 42.7–76)
NEUTROPHILS NFR BLD AUTO: 66 % (ref 42.7–76)
NT-PROBNP SERPL-MCNC: 3269 PG/ML (ref 0–900)
NT-PROBNP SERPL-MCNC: 9130 PG/ML (ref 0–900)
NT-PROBNP SERPL-MCNC: 9589 PG/ML (ref 0–900)
PERCENT MAX PREDICTED HR: 88.59 %
PLATELET # BLD AUTO: 172 10*3/MM3 (ref 140–500)
PLATELET # BLD AUTO: 185 10*3/MM3 (ref 140–500)
PLATELET # BLD AUTO: 186 10*3/MM3 (ref 140–500)
PLATELET # BLD AUTO: 191 10*3/MM3 (ref 140–500)
PLATELET # BLD AUTO: 235 10*3/MM3 (ref 140–500)
PMV BLD AUTO: 10.9 FL (ref 6–12)
PMV BLD AUTO: 11.5 FL (ref 6–12)
POTASSIUM BLD-SCNC: 3.5 MMOL/L (ref 3.5–5.2)
POTASSIUM BLD-SCNC: 3.7 MMOL/L (ref 3.5–5.2)
POTASSIUM BLD-SCNC: 3.9 MMOL/L (ref 3.5–5.2)
POTASSIUM BLD-SCNC: 3.9 MMOL/L (ref 3.5–5.2)
POTASSIUM BLD-SCNC: 4.1 MMOL/L (ref 3.5–5.2)
POTASSIUM BLD-SCNC: 4.3 MMOL/L (ref 3.5–5.2)
POTASSIUM BLD-SCNC: 4.3 MMOL/L (ref 3.5–5.2)
POTASSIUM BLD-SCNC: 4.4 MMOL/L (ref 3.5–5.2)
POTASSIUM BLD-SCNC: 4.6 MMOL/L (ref 3.5–5.2)
POTASSIUM BLD-SCNC: 5 MMOL/L (ref 3.5–5.2)
PROT SERPL-MCNC: 6.2 G/DL (ref 6–8.5)
PROT SERPL-MCNC: 6.3 G/DL (ref 6–8.5)
PROT SERPL-MCNC: 6.4 G/DL (ref 6–8.5)
PROTHROMBIN TIME: 13.3 SECONDS (ref 11.7–14.2)
PROTHROMBIN TIME: 15.1 SECONDS (ref 11.7–14.2)
PROTHROMBIN TIME: 15.4 SECONDS (ref 11.7–14.2)
PROTHROMBIN TIME: 16 SECONDS (ref 11.7–14.2)
PROTHROMBIN TIME: 21.8 SECONDS (ref 11.7–14.2)
PROTHROMBIN TIME: 27.6 SECONDS (ref 11.7–14.2)
RBC # BLD AUTO: 4.01 10*6/MM3 (ref 4.6–6)
RBC # BLD AUTO: 4.36 10*6/MM3 (ref 4.6–6)
RBC # BLD AUTO: 4.41 10*6/MM3 (ref 4.6–6)
RBC # BLD AUTO: 4.57 10*6/MM3 (ref 4.6–6)
SINUS: 3.2 CM
SODIUM BLD-SCNC: 139 MMOL/L (ref 136–145)
SODIUM BLD-SCNC: 140 MMOL/L (ref 136–145)
SODIUM BLD-SCNC: 140 MMOL/L (ref 136–145)
SODIUM BLD-SCNC: 142 MMOL/L (ref 136–145)
SODIUM BLD-SCNC: 142 MMOL/L (ref 136–145)
SODIUM BLD-SCNC: 143 MMOL/L (ref 136–145)
SODIUM BLD-SCNC: 144 MMOL/L (ref 136–145)
SODIUM BLD-SCNC: 144 MMOL/L (ref 136–145)
STJ: 3.1 CM
STRESS BASELINE BP: NORMAL MMHG
STRESS BASELINE HR: 135 BPM
STRESS PERCENT HR: 104 %
STRESS POST EXERCISE DUR SEC: 10 SEC
STRESS POST PEAK BP: NORMAL MMHG
STRESS POST PEAK HR: 132 BPM
STRESS TARGET HR: 127 BPM
STRESS TARGET HR: 127 BPM
T4 FREE SERPL-MCNC: 1.21 NG/DL (ref 0.93–1.7)
TROPONIN T SERPL-MCNC: <0.01 NG/ML (ref 0–0.03)
TSH SERPL DL<=0.05 MIU/L-ACNC: 2.29 MIU/ML (ref 0.27–4.2)
WBC NRBC COR # BLD: 7.77 10*3/MM3 (ref 4.5–10.7)
WBC NRBC COR # BLD: 7.97 10*3/MM3 (ref 4.5–10.7)
WBC NRBC COR # BLD: 8.12 10*3/MM3 (ref 4.5–10.7)
WBC NRBC COR # BLD: 8.48 10*3/MM3 (ref 4.5–10.7)

## 2018-01-01 PROCEDURE — 93010 ELECTROCARDIOGRAM REPORT: CPT | Performed by: INTERNAL MEDICINE

## 2018-01-01 PROCEDURE — 99214 OFFICE O/P EST MOD 30 MIN: CPT | Performed by: INTERNAL MEDICINE

## 2018-01-01 PROCEDURE — 94762 N-INVAS EAR/PLS OXIMTRY CONT: CPT

## 2018-01-01 PROCEDURE — 82962 GLUCOSE BLOOD TEST: CPT

## 2018-01-01 PROCEDURE — 25010000002 FUROSEMIDE PER 20 MG: Performed by: NURSE PRACTITIONER

## 2018-01-01 PROCEDURE — 80048 BASIC METABOLIC PNL TOTAL CA: CPT | Performed by: INTERNAL MEDICINE

## 2018-01-01 PROCEDURE — 85610 PROTHROMBIN TIME: CPT

## 2018-01-01 PROCEDURE — 93005 ELECTROCARDIOGRAM TRACING: CPT | Performed by: INTERNAL MEDICINE

## 2018-01-01 PROCEDURE — 94618 PULMONARY STRESS TESTING: CPT

## 2018-01-01 PROCEDURE — 85610 PROTHROMBIN TIME: CPT | Performed by: INTERNAL MEDICINE

## 2018-01-01 PROCEDURE — A9555 RB82 RUBIDIUM: HCPCS | Performed by: NURSE PRACTITIONER

## 2018-01-01 PROCEDURE — 94799 UNLISTED PULMONARY SVC/PX: CPT

## 2018-01-01 PROCEDURE — 78492 MYOCRD IMG PET MLT RST&STRS: CPT

## 2018-01-01 PROCEDURE — 99232 SBSQ HOSP IP/OBS MODERATE 35: CPT | Performed by: NURSE PRACTITIONER

## 2018-01-01 PROCEDURE — 36416 COLLJ CAPILLARY BLOOD SPEC: CPT

## 2018-01-01 PROCEDURE — G8982 BODY POS GOAL STATUS: HCPCS | Performed by: PHYSICAL THERAPIST

## 2018-01-01 PROCEDURE — 84439 ASSAY OF FREE THYROXINE: CPT

## 2018-01-01 PROCEDURE — 90791 PSYCH DIAGNOSTIC EVALUATION: CPT

## 2018-01-01 PROCEDURE — 93018 CV STRESS TEST I&R ONLY: CPT | Performed by: INTERNAL MEDICINE

## 2018-01-01 PROCEDURE — G8981 BODY POS CURRENT STATUS: HCPCS | Performed by: PHYSICAL THERAPIST

## 2018-01-01 PROCEDURE — 99215 OFFICE O/P EST HI 40 MIN: CPT | Performed by: NURSE PRACTITIONER

## 2018-01-01 PROCEDURE — 97112 NEUROMUSCULAR REEDUCATION: CPT | Performed by: PHYSICAL THERAPIST

## 2018-01-01 PROCEDURE — 99238 HOSP IP/OBS DSCHRG MGMT 30/<: CPT | Performed by: NURSE PRACTITIONER

## 2018-01-01 PROCEDURE — 83880 ASSAY OF NATRIURETIC PEPTIDE: CPT | Performed by: NURSE PRACTITIONER

## 2018-01-01 PROCEDURE — 99232 SBSQ HOSP IP/OBS MODERATE 35: CPT | Performed by: INTERNAL MEDICINE

## 2018-01-01 PROCEDURE — 97162 PT EVAL MOD COMPLEX 30 MIN: CPT | Performed by: PHYSICAL THERAPIST

## 2018-01-01 PROCEDURE — 25010000002 ENOXAPARIN PER 10 MG: Performed by: INTERNAL MEDICINE

## 2018-01-01 PROCEDURE — 84484 ASSAY OF TROPONIN QUANT: CPT | Performed by: INTERNAL MEDICINE

## 2018-01-01 PROCEDURE — 80053 COMPREHEN METABOLIC PANEL: CPT

## 2018-01-01 PROCEDURE — 85025 COMPLETE CBC W/AUTO DIFF WBC: CPT | Performed by: INTERNAL MEDICINE

## 2018-01-01 PROCEDURE — 99204 OFFICE O/P NEW MOD 45 MIN: CPT | Performed by: INTERNAL MEDICINE

## 2018-01-01 PROCEDURE — 80053 COMPREHEN METABOLIC PANEL: CPT | Performed by: INTERNAL MEDICINE

## 2018-01-01 PROCEDURE — 93000 ELECTROCARDIOGRAM COMPLETE: CPT | Performed by: INTERNAL MEDICINE

## 2018-01-01 PROCEDURE — 85379 FIBRIN DEGRADATION QUANT: CPT | Performed by: INTERNAL MEDICINE

## 2018-01-01 PROCEDURE — 85027 COMPLETE CBC AUTOMATED: CPT | Performed by: NURSE PRACTITIONER

## 2018-01-01 PROCEDURE — 36415 COLL VENOUS BLD VENIPUNCTURE: CPT

## 2018-01-01 PROCEDURE — 94640 AIRWAY INHALATION TREATMENT: CPT

## 2018-01-01 PROCEDURE — 30903 CONTROL OF NOSEBLEED: CPT

## 2018-01-01 PROCEDURE — 80053 COMPREHEN METABOLIC PANEL: CPT | Performed by: NURSE PRACTITIONER

## 2018-01-01 PROCEDURE — 93306 TTE W/DOPPLER COMPLETE: CPT | Performed by: INTERNAL MEDICINE

## 2018-01-01 PROCEDURE — 85049 AUTOMATED PLATELET COUNT: CPT | Performed by: INTERNAL MEDICINE

## 2018-01-01 PROCEDURE — 93306 TTE W/DOPPLER COMPLETE: CPT

## 2018-01-01 PROCEDURE — 0 RUBIDIUM CHLORIDE: Performed by: NURSE PRACTITIONER

## 2018-01-01 PROCEDURE — 25010000002 DIGOXIN PER 500 MCG: Performed by: INTERNAL MEDICINE

## 2018-01-01 PROCEDURE — 71046 X-RAY EXAM CHEST 2 VIEWS: CPT

## 2018-01-01 PROCEDURE — 94760 N-INVAS EAR/PLS OXIMETRY 1: CPT

## 2018-01-01 PROCEDURE — 93005 ELECTROCARDIOGRAM TRACING: CPT | Performed by: NURSE PRACTITIONER

## 2018-01-01 PROCEDURE — 93017 CV STRESS TEST TRACING ONLY: CPT

## 2018-01-01 PROCEDURE — 25010000002 REGADENOSON 0.4 MG/5ML SOLUTION: Performed by: NURSE PRACTITIONER

## 2018-01-01 PROCEDURE — 85014 HEMATOCRIT: CPT | Performed by: INTERNAL MEDICINE

## 2018-01-01 PROCEDURE — 25010000002 PERFLUTREN (DEFINITY) 8.476 MG IN SODIUM CHLORIDE 0.9 % 10 ML INJECTION: Performed by: INTERNAL MEDICINE

## 2018-01-01 PROCEDURE — 80048 BASIC METABOLIC PNL TOTAL CA: CPT | Performed by: NURSE PRACTITIONER

## 2018-01-01 PROCEDURE — 94726 PLETHYSMOGRAPHY LUNG VOLUMES: CPT

## 2018-01-01 PROCEDURE — 99233 SBSQ HOSP IP/OBS HIGH 50: CPT | Performed by: INTERNAL MEDICINE

## 2018-01-01 PROCEDURE — 83735 ASSAY OF MAGNESIUM: CPT | Performed by: INTERNAL MEDICINE

## 2018-01-01 PROCEDURE — 99231 SBSQ HOSP IP/OBS SF/LOW 25: CPT | Performed by: NURSE PRACTITIONER

## 2018-01-01 PROCEDURE — 84443 ASSAY THYROID STIM HORMONE: CPT

## 2018-01-01 PROCEDURE — 94729 DIFFUSING CAPACITY: CPT

## 2018-01-01 PROCEDURE — 94660 CPAP INITIATION&MGMT: CPT

## 2018-01-01 PROCEDURE — 85025 COMPLETE CBC W/AUTO DIFF WBC: CPT

## 2018-01-01 PROCEDURE — 93000 ELECTROCARDIOGRAM COMPLETE: CPT | Performed by: NURSE PRACTITIONER

## 2018-01-01 PROCEDURE — 99283 EMERGENCY DEPT VISIT LOW MDM: CPT

## 2018-01-01 PROCEDURE — 93016 CV STRESS TEST SUPVJ ONLY: CPT | Performed by: INTERNAL MEDICINE

## 2018-01-01 PROCEDURE — 78492 MYOCRD IMG PET MLT RST&STRS: CPT | Performed by: INTERNAL MEDICINE

## 2018-01-01 PROCEDURE — 83880 ASSAY OF NATRIURETIC PEPTIDE: CPT | Performed by: INTERNAL MEDICINE

## 2018-01-01 PROCEDURE — 94060 EVALUATION OF WHEEZING: CPT

## 2018-01-01 PROCEDURE — 99214 OFFICE O/P EST MOD 30 MIN: CPT | Performed by: NURSE PRACTITIONER

## 2018-01-01 PROCEDURE — 25010000002 MAGNESIUM SULFATE IN D5W 1G/100ML (PREMIX) 1-5 GM/100ML-% SOLUTION: Performed by: INTERNAL MEDICINE

## 2018-01-01 PROCEDURE — 85027 COMPLETE CBC AUTOMATED: CPT | Performed by: INTERNAL MEDICINE

## 2018-01-01 PROCEDURE — 93005 ELECTROCARDIOGRAM TRACING: CPT

## 2018-01-01 PROCEDURE — 85018 HEMOGLOBIN: CPT | Performed by: INTERNAL MEDICINE

## 2018-01-01 RX ORDER — DIGOXIN 0.25 MG/ML
250 INJECTION INTRAMUSCULAR; INTRAVENOUS
Status: COMPLETED | OUTPATIENT
Start: 2018-01-01 | End: 2018-01-01

## 2018-01-01 RX ORDER — METOPROLOL TARTRATE 100 MG/1
100 TABLET ORAL EVERY 8 HOURS
Status: DISCONTINUED | OUTPATIENT
Start: 2018-01-01 | End: 2018-01-01 | Stop reason: HOSPADM

## 2018-01-01 RX ORDER — MECLIZINE HYDROCHLORIDE 25 MG/1
25 TABLET ORAL 2 TIMES DAILY PRN
Status: DISCONTINUED | OUTPATIENT
Start: 2018-01-01 | End: 2018-01-01 | Stop reason: HOSPADM

## 2018-01-01 RX ORDER — ATORVASTATIN CALCIUM 10 MG/1
10 TABLET, FILM COATED ORAL NIGHTLY
Qty: 90 TABLET | Refills: 2 | Status: SHIPPED | OUTPATIENT
Start: 2018-01-01 | End: 2018-01-01 | Stop reason: SDUPTHER

## 2018-01-01 RX ORDER — CEPHALEXIN 500 MG/1
500 CAPSULE ORAL 3 TIMES DAILY
Qty: 15 CAPSULE | Refills: 0 | Status: SHIPPED | OUTPATIENT
Start: 2018-01-01 | End: 2018-01-01

## 2018-01-01 RX ORDER — HYDROCODONE BITARTRATE AND ACETAMINOPHEN 7.5; 325 MG/1; MG/1
1 TABLET ORAL EVERY 6 HOURS PRN
Qty: 120 TABLET | Refills: 0 | Status: SHIPPED | OUTPATIENT
Start: 2018-01-01

## 2018-01-01 RX ORDER — POTASSIUM CHLORIDE 750 MG/1
40 CAPSULE, EXTENDED RELEASE ORAL EVERY 12 HOURS
Status: DISCONTINUED | OUTPATIENT
Start: 2018-01-01 | End: 2018-01-01

## 2018-01-01 RX ORDER — NITROGLYCERIN 0.4 MG/1
0.4 TABLET SUBLINGUAL
Qty: 25 TABLET | Refills: 0 | Status: SHIPPED | OUTPATIENT
Start: 2018-01-01

## 2018-01-01 RX ORDER — MAGNESIUM SULFATE 1 G/100ML
1 INJECTION INTRAVENOUS
Status: COMPLETED | OUTPATIENT
Start: 2018-01-01 | End: 2018-01-01

## 2018-01-01 RX ORDER — FUROSEMIDE 40 MG/1
TABLET ORAL
Qty: 30 TABLET | Refills: 0 | OUTPATIENT
Start: 2018-01-01

## 2018-01-01 RX ORDER — NAPROXEN 500 MG/1
500 TABLET ORAL 2 TIMES DAILY WITH MEALS
COMMUNITY
End: 2018-01-01 | Stop reason: HOSPADM

## 2018-01-01 RX ORDER — DILTIAZEM HYDROCHLORIDE 60 MG/1
90 TABLET, FILM COATED ORAL 3 TIMES DAILY
Qty: 90 TABLET | Refills: 11 | Status: SHIPPED | OUTPATIENT
Start: 2018-01-01

## 2018-01-01 RX ORDER — PAROXETINE HYDROCHLORIDE 20 MG/1
20 TABLET, FILM COATED ORAL DAILY
Qty: 90 TABLET | Refills: 1 | Status: SHIPPED | OUTPATIENT
Start: 2018-01-01

## 2018-01-01 RX ORDER — ZOLPIDEM TARTRATE 5 MG/1
TABLET ORAL
Qty: 2 TABLET | Refills: 0 | Status: SHIPPED | OUTPATIENT
Start: 2018-01-01 | End: 2018-01-01

## 2018-01-01 RX ORDER — SODIUM CHLORIDE 0.9 % (FLUSH) 0.9 %
10 SYRINGE (ML) INJECTION AS NEEDED
Status: DISCONTINUED | OUTPATIENT
Start: 2018-01-01 | End: 2018-01-01

## 2018-01-01 RX ORDER — DIGOXIN 0.25 MG/ML
500 INJECTION INTRAMUSCULAR; INTRAVENOUS ONCE
Status: COMPLETED | OUTPATIENT
Start: 2018-01-01 | End: 2018-01-01

## 2018-01-01 RX ORDER — METOPROLOL TARTRATE 100 MG/1
100 TABLET ORAL EVERY 8 HOURS
Qty: 90 TABLET | Refills: 0 | Status: SHIPPED | OUTPATIENT
Start: 2018-01-01 | End: 2018-01-01 | Stop reason: SDUPTHER

## 2018-01-01 RX ORDER — SPIRONOLACTONE 25 MG/1
25 TABLET ORAL DAILY
Status: DISCONTINUED | OUTPATIENT
Start: 2018-01-01 | End: 2018-01-01

## 2018-01-01 RX ORDER — SPIRONOLACTONE 25 MG/1
TABLET ORAL
Qty: 30 TABLET | Refills: 0 | OUTPATIENT
Start: 2018-01-01

## 2018-01-01 RX ORDER — CIMETIDINE 800 MG
800 TABLET ORAL NIGHTLY
COMMUNITY
End: 2018-01-01

## 2018-01-01 RX ORDER — HYDROCODONE BITARTRATE AND ACETAMINOPHEN 7.5; 325 MG/1; MG/1
1 TABLET ORAL EVERY 6 HOURS PRN
Qty: 120 TABLET | Refills: 0 | Status: SHIPPED | OUTPATIENT
Start: 2018-01-01 | End: 2018-01-01 | Stop reason: SDUPTHER

## 2018-01-01 RX ORDER — DABIGATRAN ETEXILATE 150 MG/1
150 CAPSULE ORAL 2 TIMES DAILY WITH MEALS
Qty: 60 CAPSULE | Refills: 11 | Status: SHIPPED | OUTPATIENT
Start: 2018-01-01 | End: 2018-01-01

## 2018-01-01 RX ORDER — WARFARIN SODIUM 2.5 MG/1
TABLET ORAL
Qty: 30 TABLET | Refills: 0 | OUTPATIENT
Start: 2018-01-01

## 2018-01-01 RX ORDER — AMIODARONE HYDROCHLORIDE 200 MG/1
200 TABLET ORAL DAILY
Qty: 30 TABLET | Refills: 6 | Status: SHIPPED | OUTPATIENT
Start: 2018-01-01

## 2018-01-01 RX ORDER — ALLOPURINOL 300 MG/1
300 TABLET ORAL DAILY
Status: DISCONTINUED | OUTPATIENT
Start: 2018-01-01 | End: 2018-01-01 | Stop reason: HOSPADM

## 2018-01-01 RX ORDER — DILTIAZEM HYDROCHLORIDE 60 MG/1
60 TABLET, FILM COATED ORAL 2 TIMES DAILY
Qty: 60 TABLET | Refills: 11 | Status: SHIPPED | OUTPATIENT
Start: 2018-01-01 | End: 2018-01-01

## 2018-01-01 RX ORDER — FOLIC ACID 1 MG/1
1 TABLET ORAL DAILY
Status: DISCONTINUED | OUTPATIENT
Start: 2018-01-01 | End: 2018-01-01 | Stop reason: HOSPADM

## 2018-01-01 RX ORDER — WARFARIN SODIUM 2.5 MG/1
2.5 TABLET ORAL DAILY
Qty: 30 TABLET | Refills: 0 | Status: SHIPPED | OUTPATIENT
Start: 2018-01-01 | End: 2018-01-01 | Stop reason: SDUPTHER

## 2018-01-01 RX ORDER — NITROGLYCERIN 0.4 MG/1
0.4 TABLET SUBLINGUAL
Status: DISCONTINUED | OUTPATIENT
Start: 2018-01-01 | End: 2018-01-01

## 2018-01-01 RX ORDER — METOPROLOL SUCCINATE 100 MG/1
100 TABLET, EXTENDED RELEASE ORAL EVERY 12 HOURS SCHEDULED
Status: DISCONTINUED | OUTPATIENT
Start: 2018-01-01 | End: 2018-01-01

## 2018-01-01 RX ORDER — MECLIZINE HYDROCHLORIDE 25 MG/1
TABLET ORAL
Refills: 2 | Status: ON HOLD | COMMUNITY
Start: 2018-01-01 | End: 2018-01-01

## 2018-01-01 RX ORDER — IRBESARTAN 300 MG/1
300 TABLET ORAL DAILY
Qty: 90 TABLET | Refills: 1 | Status: SHIPPED | OUTPATIENT
Start: 2018-01-01

## 2018-01-01 RX ORDER — METOPROLOL SUCCINATE 50 MG/1
50 TABLET, EXTENDED RELEASE ORAL DAILY
Status: DISCONTINUED | OUTPATIENT
Start: 2018-01-01 | End: 2018-01-01

## 2018-01-01 RX ORDER — METOPROLOL SUCCINATE 50 MG/1
50 TABLET, EXTENDED RELEASE ORAL EVERY 12 HOURS SCHEDULED
Status: DISCONTINUED | OUTPATIENT
Start: 2018-01-01 | End: 2018-01-01

## 2018-01-01 RX ORDER — FUROSEMIDE 10 MG/ML
40 INJECTION INTRAMUSCULAR; INTRAVENOUS ONCE
Status: COMPLETED | OUTPATIENT
Start: 2018-01-01 | End: 2018-01-01

## 2018-01-01 RX ORDER — WARFARIN SODIUM 2.5 MG/1
TABLET ORAL
Qty: 30 TABLET | Refills: 0 | Status: SHIPPED | OUTPATIENT
Start: 2018-01-01 | End: 2018-01-01 | Stop reason: SDUPTHER

## 2018-01-01 RX ORDER — SODIUM CHLORIDE 0.9 % (FLUSH) 0.9 %
1-10 SYRINGE (ML) INJECTION AS NEEDED
Status: DISCONTINUED | OUTPATIENT
Start: 2018-01-01 | End: 2018-01-01 | Stop reason: HOSPADM

## 2018-01-01 RX ORDER — ATORVASTATIN CALCIUM 10 MG/1
TABLET, FILM COATED ORAL
Qty: 30 TABLET | Refills: 0 | OUTPATIENT
Start: 2018-01-01

## 2018-01-01 RX ORDER — SPIRONOLACTONE 25 MG/1
25 TABLET ORAL DAILY
Qty: 30 TABLET | Refills: 0 | Status: SHIPPED | OUTPATIENT
Start: 2018-01-01 | End: 2018-01-01 | Stop reason: SDUPTHER

## 2018-01-01 RX ORDER — BUMETANIDE 0.25 MG/ML
3 INJECTION INTRAMUSCULAR; INTRAVENOUS EVERY 12 HOURS
Status: DISCONTINUED | OUTPATIENT
Start: 2018-01-01 | End: 2018-01-01

## 2018-01-01 RX ORDER — HYDROCODONE BITARTRATE AND ACETAMINOPHEN 7.5; 325 MG/1; MG/1
1 TABLET ORAL EVERY 6 HOURS PRN
Status: DISCONTINUED | OUTPATIENT
Start: 2018-01-01 | End: 2018-01-01 | Stop reason: HOSPADM

## 2018-01-01 RX ORDER — IRBESARTAN 300 MG/1
300 TABLET ORAL DAILY
Status: DISCONTINUED | OUTPATIENT
Start: 2018-01-01 | End: 2018-01-01 | Stop reason: CLARIF

## 2018-01-01 RX ORDER — WARFARIN SODIUM 2.5 MG/1
2.5 TABLET ORAL
Status: DISCONTINUED | OUTPATIENT
Start: 2018-01-01 | End: 2018-01-01 | Stop reason: HOSPADM

## 2018-01-01 RX ORDER — PAROXETINE HYDROCHLORIDE 20 MG/1
20 TABLET, FILM COATED ORAL DAILY
Qty: 90 TABLET | Refills: 1 | Status: SHIPPED | OUTPATIENT
Start: 2018-01-01 | End: 2018-01-01 | Stop reason: SDUPTHER

## 2018-01-01 RX ORDER — LOSARTAN POTASSIUM 50 MG/1
50 TABLET ORAL
Status: DISCONTINUED | OUTPATIENT
Start: 2018-01-01 | End: 2018-01-01

## 2018-01-01 RX ORDER — MECLIZINE HYDROCHLORIDE 25 MG/1
25 TABLET ORAL EVERY 12 HOURS PRN
Refills: 2
Start: 2018-01-01

## 2018-01-01 RX ORDER — FUROSEMIDE 40 MG/1
40 TABLET ORAL DAILY
Qty: 90 TABLET | Refills: 2 | Status: SHIPPED | OUTPATIENT
Start: 2018-01-01 | End: 2018-01-01

## 2018-01-01 RX ORDER — ATORVASTATIN CALCIUM 10 MG/1
10 TABLET, FILM COATED ORAL DAILY
COMMUNITY

## 2018-01-01 RX ORDER — PAROXETINE HYDROCHLORIDE 20 MG/1
20 TABLET, FILM COATED ORAL DAILY
Status: DISCONTINUED | OUTPATIENT
Start: 2018-01-01 | End: 2018-01-01 | Stop reason: HOSPADM

## 2018-01-01 RX ORDER — ASPIRIN 81 MG/1
81 TABLET ORAL DAILY
COMMUNITY
End: 2018-01-01 | Stop reason: HOSPADM

## 2018-01-01 RX ORDER — ATORVASTATIN CALCIUM 10 MG/1
10 TABLET, FILM COATED ORAL NIGHTLY
Qty: 90 TABLET | Refills: 2 | Status: SHIPPED | OUTPATIENT
Start: 2018-01-01 | End: 2018-01-01

## 2018-01-01 RX ORDER — ACETAMINOPHEN 325 MG/1
650 TABLET ORAL EVERY 4 HOURS PRN
Status: DISCONTINUED | OUTPATIENT
Start: 2018-01-01 | End: 2018-01-01 | Stop reason: HOSPADM

## 2018-01-01 RX ORDER — METOPROLOL TARTRATE 50 MG/1
50 TABLET, FILM COATED ORAL 2 TIMES DAILY
Qty: 60 TABLET | Refills: 1 | Status: SHIPPED | OUTPATIENT
Start: 2018-01-01 | End: 2018-01-01

## 2018-01-01 RX ORDER — SPIRONOLACTONE 25 MG/1
25 TABLET ORAL DAILY
Qty: 90 TABLET | Refills: 2 | Status: SHIPPED | OUTPATIENT
Start: 2018-01-01 | End: 2018-01-01

## 2018-01-01 RX ORDER — FUROSEMIDE 40 MG/1
40 TABLET ORAL DAILY
Qty: 90 TABLET | Refills: 2 | Status: SHIPPED | OUTPATIENT
Start: 2018-01-01 | End: 2018-01-01 | Stop reason: SDUPTHER

## 2018-01-01 RX ORDER — FUROSEMIDE 40 MG/1
40 TABLET ORAL DAILY
Qty: 30 TABLET | Refills: 0 | Status: SHIPPED | OUTPATIENT
Start: 2018-01-01 | End: 2018-01-01 | Stop reason: SDUPTHER

## 2018-01-01 RX ORDER — SPIRONOLACTONE 25 MG/1
25 TABLET ORAL DAILY
Qty: 90 TABLET | Refills: 2 | Status: SHIPPED | OUTPATIENT
Start: 2018-01-01 | End: 2018-01-01 | Stop reason: SDUPTHER

## 2018-01-01 RX ORDER — WARFARIN SODIUM 10 MG/1
10 TABLET ORAL
Status: DISCONTINUED | OUTPATIENT
Start: 2018-01-01 | End: 2018-01-01

## 2018-01-01 RX ORDER — FUROSEMIDE 40 MG/1
40 TABLET ORAL DAILY
Status: DISCONTINUED | OUTPATIENT
Start: 2018-01-01 | End: 2018-01-01

## 2018-01-01 RX ORDER — WARFARIN SODIUM 5 MG/1
5 TABLET ORAL
Status: DISCONTINUED | OUTPATIENT
Start: 2018-01-01 | End: 2018-01-01

## 2018-01-01 RX ORDER — WARFARIN SODIUM 2.5 MG/1
2.5 TABLET ORAL DAILY
Qty: 30 TABLET | Refills: 2 | Status: SHIPPED | OUTPATIENT
Start: 2018-01-01

## 2018-01-01 RX ORDER — NITROGLYCERIN 0.4 MG/1
0.4 TABLET SUBLINGUAL
Status: DISCONTINUED | OUTPATIENT
Start: 2018-01-01 | End: 2018-01-01 | Stop reason: HOSPADM

## 2018-01-01 RX ORDER — ALBUTEROL SULFATE 2.5 MG/3ML
2.5 SOLUTION RESPIRATORY (INHALATION) ONCE
Status: COMPLETED | OUTPATIENT
Start: 2018-01-01 | End: 2018-01-01

## 2018-01-01 RX ORDER — SPIRONOLACTONE 50 MG/1
50 TABLET, FILM COATED ORAL DAILY
Status: DISCONTINUED | OUTPATIENT
Start: 2018-01-01 | End: 2018-01-01 | Stop reason: HOSPADM

## 2018-01-01 RX ORDER — ATORVASTATIN CALCIUM 10 MG/1
10 TABLET, FILM COATED ORAL NIGHTLY
Qty: 30 TABLET | Refills: 0 | Status: SHIPPED | OUTPATIENT
Start: 2018-01-01 | End: 2018-01-01 | Stop reason: SDUPTHER

## 2018-01-01 RX ORDER — LOSARTAN POTASSIUM 100 MG/1
100 TABLET ORAL
Status: DISCONTINUED | OUTPATIENT
Start: 2018-01-01 | End: 2018-01-01

## 2018-01-01 RX ORDER — METOPROLOL SUCCINATE 50 MG/1
50 TABLET, EXTENDED RELEASE ORAL DAILY
Qty: 90 TABLET | Refills: 3 | Status: SHIPPED | OUTPATIENT
Start: 2018-01-01 | End: 2018-01-01 | Stop reason: HOSPADM

## 2018-01-01 RX ORDER — FUROSEMIDE 80 MG
80 TABLET ORAL DAILY
Status: DISCONTINUED | OUTPATIENT
Start: 2018-01-01 | End: 2018-01-01 | Stop reason: HOSPADM

## 2018-01-01 RX ORDER — DABIGATRAN ETEXILATE 150 MG/1
150 CAPSULE ORAL 2 TIMES DAILY WITH MEALS
Qty: 60 CAPSULE | Refills: 11 | Status: SHIPPED | OUTPATIENT
Start: 2018-01-01 | End: 2018-01-01 | Stop reason: SDUPTHER

## 2018-01-01 RX ORDER — ALBUTEROL SULFATE 90 UG/1
2 AEROSOL, METERED RESPIRATORY (INHALATION) EVERY 4 HOURS PRN
Qty: 6.7 G | Refills: 6 | Status: SHIPPED | OUTPATIENT
Start: 2018-01-01

## 2018-01-01 RX ORDER — POTASSIUM CHLORIDE 750 MG/1
10 TABLET, FILM COATED, EXTENDED RELEASE ORAL DAILY
Qty: 30 TABLET | Refills: 11 | Status: SHIPPED | OUTPATIENT
Start: 2018-01-01

## 2018-01-01 RX ADMIN — ENOXAPARIN SODIUM 100 MG: 100 INJECTION SUBCUTANEOUS at 21:02

## 2018-01-01 RX ADMIN — ENOXAPARIN SODIUM 100 MG: 100 INJECTION SUBCUTANEOUS at 10:26

## 2018-01-01 RX ADMIN — ENOXAPARIN SODIUM 100 MG: 100 INJECTION SUBCUTANEOUS at 21:30

## 2018-01-01 RX ADMIN — ALLOPURINOL 300 MG: 300 TABLET ORAL at 08:46

## 2018-01-01 RX ADMIN — FOLIC ACID 1 MG: 1 TABLET ORAL at 09:04

## 2018-01-01 RX ADMIN — POTASSIUM CHLORIDE 40 MEQ: 750 CAPSULE, EXTENDED RELEASE ORAL at 06:20

## 2018-01-01 RX ADMIN — REGADENOSON 0.4 MG: 0.08 INJECTION, SOLUTION INTRAVENOUS at 11:51

## 2018-01-01 RX ADMIN — WARFARIN SODIUM 10 MG: 10 TABLET ORAL at 18:20

## 2018-01-01 RX ADMIN — HYDROCODONE BITARTRATE AND ACETAMINOPHEN 1 TABLET: 7.5; 325 TABLET ORAL at 17:28

## 2018-01-01 RX ADMIN — METOPROLOL SUCCINATE 50 MG: 50 TABLET, FILM COATED, EXTENDED RELEASE ORAL at 21:18

## 2018-01-01 RX ADMIN — METOPROLOL TARTRATE 100 MG: 100 TABLET, FILM COATED ORAL at 22:06

## 2018-01-01 RX ADMIN — Medication 10 ML: at 20:37

## 2018-01-01 RX ADMIN — ALLOPURINOL 300 MG: 300 TABLET ORAL at 09:17

## 2018-01-01 RX ADMIN — FOLIC ACID 1 MG: 1 TABLET ORAL at 10:19

## 2018-01-01 RX ADMIN — METOPROLOL TARTRATE 5 MG: 5 INJECTION, SOLUTION INTRAVENOUS at 16:02

## 2018-01-01 RX ADMIN — PHENYLEPHRINE HYDROCHLORIDE 2 SPRAY: 0.5 SPRAY NASAL at 13:29

## 2018-01-01 RX ADMIN — APIXABAN 5 MG: 5 TABLET, FILM COATED ORAL at 11:28

## 2018-01-01 RX ADMIN — ALLOPURINOL 300 MG: 300 TABLET ORAL at 08:14

## 2018-01-01 RX ADMIN — FOLIC ACID 1 MG: 1 TABLET ORAL at 09:14

## 2018-01-01 RX ADMIN — PAROXETINE HYDROCHLORIDE 20 MG: 20 TABLET, FILM COATED ORAL at 21:18

## 2018-01-01 RX ADMIN — PAROXETINE HYDROCHLORIDE 20 MG: 20 TABLET, FILM COATED ORAL at 21:29

## 2018-01-01 RX ADMIN — LOSARTAN POTASSIUM 50 MG: 50 TABLET, FILM COATED ORAL at 08:46

## 2018-01-01 RX ADMIN — BUMETANIDE 3 MG: 0.25 INJECTION INTRAMUSCULAR; INTRAVENOUS at 21:13

## 2018-01-01 RX ADMIN — METOPROLOL TARTRATE 100 MG: 100 TABLET, FILM COATED ORAL at 06:20

## 2018-01-01 RX ADMIN — SPIRONOLACTONE 50 MG: 50 TABLET, FILM COATED ORAL at 09:14

## 2018-01-01 RX ADMIN — HYDROCODONE BITARTRATE AND ACETAMINOPHEN 1 TABLET: 7.5; 325 TABLET ORAL at 10:57

## 2018-01-01 RX ADMIN — HYDROCODONE BITARTRATE AND ACETAMINOPHEN 1 TABLET: 7.5; 325 TABLET ORAL at 08:21

## 2018-01-01 RX ADMIN — HYDROCODONE BITARTRATE AND ACETAMINOPHEN 1 TABLET: 7.5; 325 TABLET ORAL at 12:35

## 2018-01-01 RX ADMIN — HYDROCODONE BITARTRATE AND ACETAMINOPHEN 1 TABLET: 7.5; 325 TABLET ORAL at 14:26

## 2018-01-01 RX ADMIN — PAROXETINE HYDROCHLORIDE 20 MG: 20 TABLET, FILM COATED ORAL at 21:25

## 2018-01-01 RX ADMIN — MAGNESIUM SULFATE HEPTAHYDRATE 1 G: 1 INJECTION, SOLUTION INTRAVENOUS at 08:15

## 2018-01-01 RX ADMIN — FOLIC ACID 1 MG: 1 TABLET ORAL at 09:17

## 2018-01-01 RX ADMIN — DIGOXIN 250 MCG: 0.25 INJECTION INTRAMUSCULAR; INTRAVENOUS at 19:42

## 2018-01-01 RX ADMIN — SPIRONOLACTONE 25 MG: 25 TABLET, FILM COATED ORAL at 08:46

## 2018-01-01 RX ADMIN — MAGNESIUM SULFATE HEPTAHYDRATE 1 G: 1 INJECTION, SOLUTION INTRAVENOUS at 09:17

## 2018-01-01 RX ADMIN — METOPROLOL TARTRATE 5 MG: 5 INJECTION, SOLUTION INTRAVENOUS at 20:37

## 2018-01-01 RX ADMIN — Medication 10 ML: at 21:46

## 2018-01-01 RX ADMIN — APIXABAN 5 MG: 5 TABLET, FILM COATED ORAL at 21:12

## 2018-01-01 RX ADMIN — FUROSEMIDE 40 MG: 10 INJECTION, SOLUTION INTRAMUSCULAR; INTRAVENOUS at 20:34

## 2018-01-01 RX ADMIN — METOPROLOL TARTRATE 100 MG: 100 TABLET, FILM COATED ORAL at 15:15

## 2018-01-01 RX ADMIN — PAROXETINE HYDROCHLORIDE 20 MG: 20 TABLET, FILM COATED ORAL at 21:12

## 2018-01-01 RX ADMIN — DIGOXIN 250 MCG: 0.25 INJECTION INTRAMUSCULAR; INTRAVENOUS at 21:42

## 2018-01-01 RX ADMIN — METOPROLOL TARTRATE 100 MG: 100 TABLET, FILM COATED ORAL at 06:21

## 2018-01-01 RX ADMIN — METOPROLOL TARTRATE 100 MG: 100 TABLET, FILM COATED ORAL at 21:29

## 2018-01-01 RX ADMIN — METOPROLOL TARTRATE 100 MG: 100 TABLET, FILM COATED ORAL at 21:26

## 2018-01-01 RX ADMIN — SPIRONOLACTONE 50 MG: 50 TABLET, FILM COATED ORAL at 10:19

## 2018-01-01 RX ADMIN — HYDROCODONE BITARTRATE AND ACETAMINOPHEN 1 TABLET: 7.5; 325 TABLET ORAL at 21:34

## 2018-01-01 RX ADMIN — METOPROLOL TARTRATE 5 MG: 5 INJECTION, SOLUTION INTRAVENOUS at 17:39

## 2018-01-01 RX ADMIN — SPIRONOLACTONE 25 MG: 25 TABLET, FILM COATED ORAL at 21:12

## 2018-01-01 RX ADMIN — METOPROLOL TARTRATE 100 MG: 100 TABLET, FILM COATED ORAL at 12:35

## 2018-01-01 RX ADMIN — FUROSEMIDE 80 MG: 80 TABLET ORAL at 09:14

## 2018-01-01 RX ADMIN — ENOXAPARIN SODIUM 100 MG: 100 INJECTION SUBCUTANEOUS at 10:03

## 2018-01-01 RX ADMIN — HYDROCODONE BITARTRATE AND ACETAMINOPHEN 1 TABLET: 7.5; 325 TABLET ORAL at 15:18

## 2018-01-01 RX ADMIN — HYDROCODONE BITARTRATE AND ACETAMINOPHEN 1 TABLET: 7.5; 325 TABLET ORAL at 13:26

## 2018-01-01 RX ADMIN — Medication 10 ML: at 20:39

## 2018-01-01 RX ADMIN — PAROXETINE HYDROCHLORIDE 20 MG: 20 TABLET, FILM COATED ORAL at 20:18

## 2018-01-01 RX ADMIN — POTASSIUM CHLORIDE 40 MEQ: 750 CAPSULE, EXTENDED RELEASE ORAL at 19:41

## 2018-01-01 RX ADMIN — FUROSEMIDE 80 MG: 80 TABLET ORAL at 17:24

## 2018-01-01 RX ADMIN — BUMETANIDE 3 MG: 0.25 INJECTION INTRAMUSCULAR; INTRAVENOUS at 20:17

## 2018-01-01 RX ADMIN — FUROSEMIDE 80 MG: 80 TABLET ORAL at 08:14

## 2018-01-01 RX ADMIN — FOLIC ACID 1 MG: 1 TABLET ORAL at 08:46

## 2018-01-01 RX ADMIN — METOPROLOL TARTRATE 100 MG: 100 TABLET, FILM COATED ORAL at 21:02

## 2018-01-01 RX ADMIN — Medication 10 ML: at 20:34

## 2018-01-01 RX ADMIN — PAROXETINE HYDROCHLORIDE 20 MG: 20 TABLET, FILM COATED ORAL at 21:02

## 2018-01-01 RX ADMIN — ENOXAPARIN SODIUM 100 MG: 100 INJECTION SUBCUTANEOUS at 21:28

## 2018-01-01 RX ADMIN — RUBIDIUM CHLORIDE RB-82 1 DOSE: 150 INJECTION, SOLUTION INTRAVENOUS at 11:41

## 2018-01-01 RX ADMIN — HYDROCODONE BITARTRATE AND ACETAMINOPHEN 1 TABLET: 7.5; 325 TABLET ORAL at 22:06

## 2018-01-01 RX ADMIN — FUROSEMIDE 40 MG: 40 TABLET ORAL at 11:28

## 2018-01-01 RX ADMIN — ALLOPURINOL 300 MG: 300 TABLET ORAL at 09:04

## 2018-01-01 RX ADMIN — FOLIC ACID 1 MG: 1 TABLET ORAL at 08:14

## 2018-01-01 RX ADMIN — ALBUTEROL SULFATE 2.5 MG: 2.5 SOLUTION RESPIRATORY (INHALATION) at 11:31

## 2018-01-01 RX ADMIN — ALLOPURINOL 300 MG: 300 TABLET ORAL at 10:19

## 2018-01-01 RX ADMIN — METOPROLOL TARTRATE 100 MG: 100 TABLET, FILM COATED ORAL at 05:33

## 2018-01-01 RX ADMIN — METOPROLOL SUCCINATE 50 MG: 50 TABLET, FILM COATED, EXTENDED RELEASE ORAL at 09:04

## 2018-01-01 RX ADMIN — BUMETANIDE 3 MG: 0.25 INJECTION INTRAMUSCULAR; INTRAVENOUS at 09:17

## 2018-01-01 RX ADMIN — METOPROLOL TARTRATE 100 MG: 100 TABLET, FILM COATED ORAL at 05:37

## 2018-01-01 RX ADMIN — FUROSEMIDE 80 MG: 80 TABLET ORAL at 10:19

## 2018-01-01 RX ADMIN — METOPROLOL TARTRATE 100 MG: 100 TABLET, FILM COATED ORAL at 14:16

## 2018-01-01 RX ADMIN — LOSARTAN POTASSIUM 50 MG: 50 TABLET, FILM COATED ORAL at 10:26

## 2018-01-01 RX ADMIN — ALLOPURINOL 300 MG: 300 TABLET ORAL at 09:14

## 2018-01-01 RX ADMIN — METOPROLOL TARTRATE 100 MG: 100 TABLET, FILM COATED ORAL at 14:23

## 2018-01-01 RX ADMIN — PERFLUTREN 1.5 ML: 6.52 INJECTION, SUSPENSION INTRAVENOUS at 16:09

## 2018-01-01 RX ADMIN — ENOXAPARIN SODIUM 100 MG: 100 INJECTION SUBCUTANEOUS at 22:06

## 2018-01-01 RX ADMIN — METOPROLOL TARTRATE 100 MG: 100 TABLET, FILM COATED ORAL at 17:39

## 2018-01-01 RX ADMIN — WARFARIN SODIUM 5 MG: 5 TABLET ORAL at 17:15

## 2018-01-01 RX ADMIN — RUBIDIUM CHLORIDE RB-82 1 DOSE: 150 INJECTION, SOLUTION INTRAVENOUS at 11:51

## 2018-01-01 RX ADMIN — ENOXAPARIN SODIUM 100 MG: 100 INJECTION SUBCUTANEOUS at 10:19

## 2018-01-01 RX ADMIN — WARFARIN SODIUM 10 MG: 10 TABLET ORAL at 17:55

## 2018-01-01 RX ADMIN — SPIRONOLACTONE 50 MG: 50 TABLET, FILM COATED ORAL at 08:14

## 2018-01-01 RX ADMIN — HYDROCODONE BITARTRATE AND ACETAMINOPHEN 1 TABLET: 7.5; 325 TABLET ORAL at 06:23

## 2018-01-01 RX ADMIN — LOSARTAN POTASSIUM 100 MG: 100 TABLET ORAL at 21:18

## 2018-01-01 RX ADMIN — POTASSIUM CHLORIDE 40 MEQ: 750 CAPSULE, EXTENDED RELEASE ORAL at 20:18

## 2018-01-01 RX ADMIN — METOPROLOL TARTRATE 100 MG: 100 TABLET, FILM COATED ORAL at 05:09

## 2018-01-01 RX ADMIN — DIGOXIN 500 MCG: 0.25 INJECTION INTRAMUSCULAR; INTRAVENOUS at 15:35

## 2018-01-01 RX ADMIN — WARFARIN SODIUM 10 MG: 10 TABLET ORAL at 17:24

## 2018-01-01 RX ADMIN — ENOXAPARIN SODIUM 100 MG: 100 INJECTION SUBCUTANEOUS at 09:15

## 2018-01-01 RX ADMIN — METOPROLOL TARTRATE 5 MG: 5 INJECTION, SOLUTION INTRAVENOUS at 16:45

## 2018-01-01 RX ADMIN — BUMETANIDE 3 MG: 0.25 INJECTION INTRAMUSCULAR; INTRAVENOUS at 08:46

## 2018-01-01 RX ADMIN — WARFARIN SODIUM 5 MG: 5 TABLET ORAL at 21:43

## 2018-01-01 RX ADMIN — HYDROCODONE BITARTRATE AND ACETAMINOPHEN 1 TABLET: 7.5; 325 TABLET ORAL at 21:12

## 2018-01-01 RX ADMIN — HYDROCODONE BITARTRATE AND ACETAMINOPHEN 1 TABLET: 7.5; 325 TABLET ORAL at 21:18

## 2018-02-08 PROBLEM — H93.8X3 EAR CONGESTION, BILATERAL: Status: ACTIVE | Noted: 2018-01-01

## 2018-02-08 PROBLEM — R06.02 SHORTNESS OF BREATH: Status: ACTIVE | Noted: 2018-01-01

## 2018-02-08 PROBLEM — R06.09 DOE (DYSPNEA ON EXERTION): Status: ACTIVE | Noted: 2018-01-01

## 2018-02-08 PROBLEM — I48.91 NEW ONSET A-FIB (HCC): Status: ACTIVE | Noted: 2018-01-01

## 2018-02-08 PROBLEM — R31.0 GROSS HEMATURIA: Status: ACTIVE | Noted: 2018-01-01

## 2018-02-08 PROBLEM — H81.13 BENIGN PAROXYSMAL POSITIONAL VERTIGO DUE TO BILATERAL VESTIBULAR DISORDER: Status: ACTIVE | Noted: 2018-01-01

## 2018-02-08 NOTE — ADDENDUM NOTE
Encounter addended by: Kat Chaparro RN on: 2/8/2018  4:53 PM<BR>     Actions taken: Visit Navigator SmartForm Flowsheet section accepted

## 2018-02-08 NOTE — PROGRESS NOTES
Procedure     ECG 12 Lead  Date/Time: 2/8/2018 11:35 AM  Performed by: VINCENT LEMA  Authorized by: VINCENT LEMA   Comparison: not compared with previous ECG   Previous ECG: no previous ECG available  Rhythm: sinus rhythm and atrial fibrillation  Rate: tachycardic  Conduction: conduction normal  Conduction: complete RBBB  ST Segments: ST segments normal  T Waves: T waves normal  T depression: V4, V2, V3, V5 and V6  QRS axis: indeterminate  Other: no other findings  Clinical impression: abnormal ECG and dysrhythmia - atrial

## 2018-02-08 NOTE — PROGRESS NOTES
Date of Office Visit: 2018  Encounter Provider: Liz Campos MD  Place of Service: [facility] CARDIOLOGY  Patient Name: Steve Montana  :1945      Patient ID:  Steve Montana is a 72 y.o. male is here for dyspnea and weakness.           History of Present Illness    Mr. Montana is a 72-year-old man with a history of bladder cancer with hematuria, coronary artery disease status post coronary stent in the remote past, history of diabetes mellitus now diet controlled, hypertention, hyperlipidemia, alcohol abuse and tobacco use.  This morning he woke up and was short-winded.  He was weak.  He had no chest pain.  He had no dizziness or syncope.  He says he's been having intermittent spells of this over several months.  At times he felt his heart racing.  He's had no chest pain.  When he arrived, he was in atrial fibrillation with rapid ventricular response.    He has had influenza and pneumonia lately.  He does not use oxygen at home.  He does likely have COPD but this is untreated.  He has 3-4 cigars a day and has 3-4 (shots of liquor)alcoholic beverages daily.  He's had no vomiting blood or blood in the stool but has had some mild hematuria lately.  He was seen by Dr. Craven today and ECG showed atrial fibrillation.  He was sent in for further evaluation.    There is no family history of premature cardiovascular disease.  His dad did have hypertension and pancreatic cancer.  His brother  at 59 with a thoracic aortic aneurysm.    His d-dimer is 0.65, proBNP 3269, CMP normal except glucose at 127 and CBC normal.      Past Medical History:   Diagnosis Date   • Abdominal cramping, periumbilical    • Bladder cancer    • Coronary artery disease     Stent placement   • Depression    • Diabetes mellitus type II, controlled, with no complications    • Essential hypertension, benign    • Gout    • Hyperlipidemia    • Meningioma    • Neoplasm of uncertain behavior of skin     • Osteoarthritis, multiple sites    • Pancreatitis    • Substance abuse    • Tobacco use disorder          Past Surgical History:   Procedure Laterality Date   • BRAIN TUMOR EXCISION Left 11/2015   • CHOLECYSTECTOMY     • CORONARY STENT PLACEMENT     • KNEE ARTHROSCOPY Left    • SHOULDER ROTATOR CUFF REPAIR Left        Current Outpatient Prescriptions on File Prior to Encounter   Medication Sig Dispense Refill   • allopurinol (ZYLOPRIM) 300 MG tablet TAKE ONE TABLET BY MOUTH DAILY 90 tablet 2   • cyanocobalamin ([facility] VITAMIN B-12) 1000 MCG tablet 1 tablet daily 30 tablet 5   • folic acid (FOLVITE) 1 MG tablet Take 1 tablet by mouth Daily. 30 tablet 5   • hydrochlorothiazide (HYDRODIURIL) 12.5 MG tablet TAKE ONE TABLET BY MOUTH EVERY MORNING 90 tablet 3   • HYDROcodone-acetaminophen (NORCO) 7.5-325 MG per tablet Take 1 tablet by mouth Every 6 (Six) Hours As Needed for Severe Pain . 120 tablet 0   • irbesartan (AVAPRO) 300 MG tablet TAKE ONE TABLET BY MOUTH ONCE DAILY 90 tablet 1   • meclizine (ANTIVERT) 25 MG tablet TK 1 T PO  Q  6 H PRF DIZZINES  2   • PARoxetine (PAXIL) 20 MG tablet TAKE ONE TABLET BY MOUTH DAILY 90 tablet 1   • [DISCONTINUED] aspirin 81 MG tablet Take 81 mg by mouth.     • [DISCONTINUED] cimetidine (TAGAMET) 800 MG tablet TAKE ONE TABLET BY MOUTH DAILY 90 tablet 1   • [DISCONTINUED] naproxen (NAPROSYN) 500 MG tablet TAKE ONE TABLET BY MOUTH TWICE A DAY WITH FOOD 60 tablet 10   • [DISCONTINUED] HYDROcodone-acetaminophen (NORCO) 7.5-325 MG per tablet Take 1 tablet by mouth Every 6 (Six) Hours As Needed for Severe Pain . 120 tablet 0   • [DISCONTINUED] irbesartan (AVAPRO) 300 MG tablet Take 1 tablet by mouth Daily. 90 tablet 3   • [DISCONTINUED] naproxen (NAPROSYN) 500 MG tablet Take 500 mg by mouth 2 (two) times a day with meals.       No current facility-administered medications on file prior to encounter.        Social History     Social History   • Marital status:      Spouse name: N/A    • Number of children: N/A   • Years of education: N/A     Occupational History   • Not on file.     Social History Main Topics   • Smoking status: Current Every Day Smoker     Types: Cigars   • Smokeless tobacco: Never Used      Comment: 3-4 daily   • Alcohol use 8.4 oz/week     14 Shots of liquor per week      Comment: Caffeine: coffee- rare; Soda- Sprite 3- 12 oz cans daily   • Drug use: No   • Sexual activity: Defer     Other Topics Concern   • Not on file     Social History Narrative           Review of Systems   Constitution: Positive for weakness.   HENT: Negative for congestion.    Eyes: Negative for vision loss in left eye and vision loss in right eye.   Cardiovascular: Positive for dyspnea on exertion.   Respiratory: Negative.  Negative for cough, hemoptysis, shortness of breath, sleep disturbances due to breathing, snoring, sputum production and wheezing.    Endocrine: Negative.    Hematologic/Lymphatic: Negative.    Skin: Negative for poor wound healing and rash.   Musculoskeletal: Negative for falls, gout, muscle cramps and myalgias.   Gastrointestinal: Negative for abdominal pain, diarrhea, dysphagia, hematemesis, melena, nausea and vomiting.   Neurological: Negative for excessive daytime sleepiness, dizziness, headaches, light-headedness, loss of balance, seizures and vertigo.   Psychiatric/Behavioral: Negative for depression and substance abuse. The patient is not nervous/anxious.        Procedures    ECG 12 Lead  Date/Time: 2/8/2018 2:50 PM  Performed by: GUIDO MCCRARY  Authorized by: GUIDO MCCRARY   Comparison: compared with previous ECG   Comparison to previous ECG: A fib is no longer seen  Rhythm: sinus rhythm  Conduction: right bundle branch block  Clinical impression: abnormal ECG               Objective:      Vitals:    02/08/18 1213 02/08/18 1216   BP: 136/93 130/100   BP Location: Right arm Left arm   Patient Position: Sitting Sitting   Pulse: (!) 140    Resp: 18    SpO2: 92%   "  Weight: 109 kg (240 lb)    Height: 172.7 cm (67.99\")      Body mass index is 36.5 kg/(m^2).    Physical Exam   Constitutional: He is oriented to person, place, and time. He appears well-developed and well-nourished. No distress.   obese   HENT:   Head: Normocephalic and atraumatic.   Eyes: Conjunctivae are normal. No scleral icterus.   Neck: Neck supple. No JVD present. Carotid bruit is not present. No thyromegaly present.   Cardiovascular: Normal rate, regular rhythm, S1 normal, S2 normal, normal heart sounds and intact distal pulses.   No extrasystoles are present. PMI is not displaced.  Exam reveals no gallop.    No murmur heard.  Pulses:       Carotid pulses are 2+ on the right side, and 2+ on the left side.       Radial pulses are 2+ on the right side, and 2+ on the left side.        Dorsalis pedis pulses are 2+ on the right side, and 2+ on the left side.        Posterior tibial pulses are 2+ on the right side, and 2+ on the left side.   Pulmonary/Chest: Effort normal. No respiratory distress. He has decreased breath sounds. He has no wheezes. He has no rhonchi. He has no rales. He exhibits no tenderness.   Abdominal: Soft. Bowel sounds are normal. He exhibits no distension, no abdominal bruit and no mass. There is no tenderness.   Musculoskeletal: He exhibits no edema or deformity.   Lymphadenopathy:     He has no cervical adenopathy.   Neurological: He is alert and oriented to person, place, and time. No cranial nerve deficit.   Skin: Skin is warm and dry. No rash noted. He is not diaphoretic. No cyanosis. No pallor. Nails show no clubbing.   Psychiatric: He has a normal mood and affect. Judgment normal.   Vitals reviewed.      Lab Review:       Assessment:      Diagnosis Plan   1. New onset a-fib/ RVR     2. Shortness of breath  Cardiac Monitoring    Vital Signs - Once    Vital Signs - As Needed    Pulse Oximetry    Oxygen Therapy- Nasal Cannula; Titrate for SPO2: 92%, equal to or greater than    Insert " Peripheral IV    sodium chloride 0.9 % flush 10 mL    nitroglycerin (NITROSTAT) SL tablet 0.4 mg    NPO Diet    Bathroom Privileges With Assistance    CBC & Differential    Comprehensive Metabolic Panel    Troponin T    D-dimer    proBNP    Cardiac Monitoring    Vital Signs - Once    Insert Peripheral IV    NPO Diet    Bathroom Privileges With Assistance    Troponin T    Troponin T    D-dimer    D-dimer    proBNP    proBNP     1. Atrial fibrillation with rapid ventricular response now back in normal sinus rhythm.  Start Eliquis 5 mg twice daily and Toprol-XL 50 mg nightly  2. Tobacco use, advised cessation  3. Heavy alcohol use, advised cessation  4. Likely COPD  5. Obesity  6. History of coronary artery disease in the remote history of stent, no current angina but does have dyspnea  7. Hypertension, under fair control  8. Hyperlipidemia  9. History of bladder cancer and now has had mild hematuria.  Recommended follow-up with Dr. Lincoln  10. Likely BUFFY     Plan:       Check echo - ok to go home today.  See Evelia in 1 month.  Start medications.     Addendum:    Will need outpt stress study probably after next visit.  Feels weak now and would like to recover but will call if dyspnea returns or of has chest pain.     Echo 2/8/18    · Calculated right ventricular systolic pressure from tricuspid regurgitation is 19 mmHg.  · Left ventricular wall thickness is consistent with moderate concentric hypertrophy.  · The following left ventricular wall segments are hypokinetic: mid anterior, apical anterior, mid anterolateral, mid anteroseptal and basal anterior.  · Left atrial cavity size is moderately dilated.  · Mildly reduced right ventricular systolic function noted.  · Left ventricular systolic function is mildly decreased. Estimated EF = 41%.  · Left ventricular diastolic dysfunction (grade III) consistent with reversible restrictive pattern.

## 2018-02-08 NOTE — PATIENT INSTRUCTIONS
Begin taking Eliquis 5mg one tablet every 12 hours. Begin tonight. You have been given samples, and a prescription has been called in to your pharmacy.     Begin taking Metoprolol Succinate 50mg one tablet by mouth daily. Begin today if possible.     Atrial Fibrillation  Atrial fibrillation is a type of irregular or rapid heartbeat (arrhythmia). In atrial fibrillation, the heart quivers continuously in a chaotic pattern. This occurs when parts of the heart receive disorganized signals that make the heart unable to pump blood normally. This can increase the risk for stroke, heart failure, and other heart-related conditions. There are different types of atrial fibrillation, including:  · Paroxysmal atrial fibrillation. This type starts suddenly, and it usually stops on its own shortly after it starts.  · Persistent atrial fibrillation. This type often lasts longer than a week. It may stop on its own or with treatment.  · Long-lasting persistent atrial fibrillation. This type lasts longer than 12 months.  · Permanent atrial fibrillation. This type does not go away.  Talk with your health care provider to learn about the type of atrial fibrillation that you have.  What are the causes?  This condition is caused by some heart-related conditions or procedures, including:  · A heart attack.  · Coronary artery disease.  · Heart failure.  · Heart valve conditions.  · High blood pressure.  · Inflammation of the sac that surrounds the heart (pericarditis).  · Heart surgery.  · Certain heart rhythm disorders, such as Cota-Parkinson-White syndrome.  Other causes include:  · Pneumonia.  · Obstructive sleep apnea.  · Blockage of an artery in the lungs (pulmonary embolism, or PE).  · Lung cancer.  · Chronic lung disease.  · Thyroid problems, especially if the thyroid is overactive (hyperthyroidism).  · Caffeine.  · Excessive alcohol use or illegal drug use.  · Use of some medicines, including certain decongestants and diet  pills.  Sometimes, the cause cannot be found.  What increases the risk?  This condition is more likely to develop in:  · People who are older in age.  · People who smoke.  · People who have diabetes mellitus.  · People who are overweight (obese).  · Athletes who exercise vigorously.  What are the signs or symptoms?  Symptoms of this condition include:  · A feeling that your heart is beating rapidly or irregularly.  · A feeling of discomfort or pain in your chest.  · Shortness of breath.  · Sudden light-headedness or weakness.  · Getting tired easily during exercise.  In some cases, there are no symptoms.  How is this diagnosed?  Your health care provider may be able to detect atrial fibrillation when taking your pulse. If detected, this condition may be diagnosed with:  · An electrocardiogram (ECG).  · A Holter monitor test that records your heartbeat patterns over a 24-hour period.  · Transthoracic echocardiogram (TTE) to evaluate how blood flows through your heart.  · Transesophageal echocardiogram (HANNAH) to view more detailed images of your heart.  · A stress test.  · Imaging tests, such as a CT scan or chest X-ray.  · Blood tests.  How is this treated?  The main goals of treatment are to prevent blood clots from forming and to keep your heart beating at a normal rate and rhythm. The type of treatment that you receive depends on many factors, such as your underlying medical conditions and how you feel when you are experiencing atrial fibrillation.  This condition may be treated with:  · Medicine to slow down the heart rate, bring the heart’s rhythm back to normal, or prevent clots from forming.  · Electrical cardioversion. This is a procedure that resets your heart’s rhythm by delivering a controlled, low-energy shock to the heart through your skin.  · Different types of ablation, such as catheter ablation, catheter ablation with pacemaker, or surgical ablation. These procedures destroy the heart tissues that send  abnormal signals. When the pacemaker is used, it is placed under your skin to help your heart beat in a regular rhythm.  Follow these instructions at home:  · Take over-the counter and prescription medicines only as told by your health care provider.  · If your health care provider prescribed a blood-thinning medicine (anticoagulant), take it exactly as told. Taking too much blood-thinning medicine can cause bleeding. If you do not take enough blood-thinning medicine, you will not have the protection that you need against stroke and other problems.  · Do not use tobacco products, including cigarettes, chewing tobacco, and e-cigarettes. If you need help quitting, ask your health care provider.  · If you have obstructive sleep apnea, manage your condition as told by your health care provider.  · Do not drink alcohol.  · Do not drink beverages that contain caffeine, such as coffee, soda, and tea.  · Maintain a healthy weight. Do not use diet pills unless your health care provider approves. Diet pills may make heart problems worse.  · Follow diet instructions as told by your health care provider.  · Exercise regularly as told by your health care provider.  · Keep all follow-up visits as told by your health care provider. This is important.  How is this prevented?  · Avoid drinking beverages that contain caffeine or alcohol.  · Avoid certain medicines, especially medicines that are used for breathing problems.  · Avoid certain herbs and herbal medicines, such as those that contain ephedra or ginseng.  · Do not use illegal drugs, such as cocaine and amphetamines.  · Do not smoke.  · Manage your high blood pressure.  Contact a health care provider if:  · You notice a change in the rate, rhythm, or strength of your heartbeat.  · You are taking an anticoagulant and you notice increased bruising.  · You tire more easily when you exercise or exert yourself.  Get help right away if:  · You have chest pain, abdominal pain,  sweating, or weakness.  · You feel nauseous.  · You notice blood in your vomit, bowel movement, or urine.  · You have shortness of breath.  · You suddenly have swollen feet and ankles.  · You feel dizzy.  · You have sudden weakness or numbness of the face, arm, or leg, especially on one side of the body.  · You have trouble speaking, trouble understanding, or both (aphasia).  · Your face or your eyelid droops on one side.  These symptoms may represent a serious problem that is an emergency. Do not wait to see if the symptoms will go away. Get medical help right away. Call your local emergency services (911 in the [place] Do not drive yourself to the hospital.   This information is not intended to replace advice given to you by your health care provider. Make sure you discuss any questions you have with your health care provider.  Document Released: 12/18/2006 Document Revised: 04/26/2017 Document Reviewed: 04/13/2016  [employer] Interactive Patient Education © 2017 [employer]

## 2018-02-08 NOTE — PROGRESS NOTES
"To PCP today for routine check up. Abnormal EKG. Referred to [facility]. SOA over \"few months\", occurs with minimal exertion and when lying flat, although able to sleep lying flat. Episodes of nocturnal diaphoresis, although not daily. No CP, N/V, fever currently. Did have flu-like symptoms one week ago, including fever, chills, decreased appetite and fatigue.     72 y.o.    172.7 cm (67.99\") 109 kg (240 lb)    Review of patient's allergies indicates no known allergies.    Todd Craven MD    Chief Complaint   Patient presents with   • Abnormal ECG       Past Medical History:   Diagnosis Date   • Abdominal cramping, periumbilical    • Bladder cancer    • Coronary artery disease     Stent placement   • Depression    • Diabetes mellitus type II, controlled, with no complications    • Essential hypertension, benign    • Hyperlipidemia    • Meningioma    • Neoplasm of uncertain behavior of skin    • Osteoarthritis, multiple sites    • Pancreatitis    • Substance abuse    • Tobacco use disorder        Past Surgical History:   Procedure Laterality Date   • BRAIN TUMOR EXCISION Left 2015   • CHOLECYSTECTOMY     • CORONARY STENT PLACEMENT     • KNEE ARTHROSCOPY Left    • SHOULDER ROTATOR CUFF REPAIR Left        Social History     Social History   • Marital status:      Spouse name: N/A   • Number of children: N/A   • Years of education: N/A     Social History Main Topics   • Smoking status: Current Every Day Smoker     Types: Cigars   • Smokeless tobacco: Never Used      Comment: 3-4 daily   • Alcohol use 8.4 oz/week     14 Shots of liquor per week      Comment: Caffeine: coffee- rare; Soda- Sprite 3- 12 oz cans daily   • Drug use: No   • Sexual activity: Defer     Other Topics Concern   • None     Social History Narrative       Family History   Problem Relation Age of Onset   • Depression Mother       at 80   • Pancreatic cancer Father       at 67   • Hypertension Father 40   • Aneurysm Brother      " "thoracic:   at 59       Vitals:    18 1213 18 1216   BP: 136/93 130/100   BP Location: Right arm Left arm   Patient Position: Sitting Sitting   Pulse: (!) 140    Resp: 18    SpO2: 92%    Weight: 109 kg (240 lb)    Height: 172.7 cm (67.99\")          Current Outpatient Prescriptions:   •  allopurinol (ZYLOPRIM) 300 MG tablet, TAKE ONE TABLET BY MOUTH DAILY, Disp: 90 tablet, Rfl: 2  •  Ascorbic Acid (VITAMIN C PO), Take 1 tablet by mouth Daily., Disp: , Rfl:   •  aspirin 81 MG tablet, Take 81 mg by mouth., Disp: , Rfl:   •  cimetidine (TAGAMET) 800 MG tablet, TAKE ONE TABLET BY MOUTH DAILY, Disp: 90 tablet, Rfl: 1  •  cyanocobalamin ([facility] VITAMIN B-12) 1000 MCG tablet, 1 tablet daily, Disp: 30 tablet, Rfl: 5  •  folic acid (FOLVITE) 1 MG tablet, Take 1 tablet by mouth Daily., Disp: 30 tablet, Rfl: 5  •  hydrochlorothiazide (HYDRODIURIL) 12.5 MG tablet, TAKE ONE TABLET BY MOUTH EVERY MORNING, Disp: 90 tablet, Rfl: 3  •  HYDROcodone-acetaminophen (NORCO) 7.5-325 MG per tablet, Take 1 tablet by mouth Every 6 (Six) Hours As Needed for Severe Pain ., Disp: 120 tablet, Rfl: 0  •  irbesartan (AVAPRO) 300 MG tablet, TAKE ONE TABLET BY MOUTH ONCE DAILY, Disp: 90 tablet, Rfl: 1  •  meclizine (ANTIVERT) 25 MG tablet, TK 1 T PO  Q  6 H PRF DIZZINES, Disp: , Rfl: 2  •  naproxen (NAPROSYN) 500 MG tablet, TAKE ONE TABLET BY MOUTH TWICE A DAY WITH FOOD, Disp: 60 tablet, Rfl: 10  •  PARoxetine (PAXIL) 20 MG tablet, TAKE ONE TABLET BY MOUTH DAILY, Disp: 90 tablet, Rfl: 1    Current Facility-Administered Medications:   •  nitroglycerin (NITROSTAT) SL tablet 0.4 mg, 0.4 mg, Sublingual, Q5 Min PRN, Liz Campos MD  •  sodium chloride 0.9 % flush 10 mL, 10 mL, Intravenous, PRN, Liz Campos MD  "

## 2018-02-09 NOTE — TELEPHONE ENCOUNTER
You saw this pt yesterday in the [facility] for a-fib and had prescribed him eliquis. Pt states it will cost him $456 and he cannot afford that. He wanted to know what he could get cheaper?

## 2018-02-11 NOTE — ED TRIAGE NOTES
"Pt denies taking blood thinners. Reports he was prescribed something but d/t cost not taking currently. Working with his md to \"figure something out\".   "

## 2018-02-11 NOTE — DISCHARGE INSTRUCTIONS
Continue home medications and do not start Pradaxa at this time.  The packing should be removed in 2-3 days.  Return to the ED if you cannot get in to see Dr. Mera by Tuesday or if one side of the packing comes out, if you develop bleeding around packing or if you develop a fever.

## 2018-02-11 NOTE — ED PROVIDER NOTES
EMERGENCY DEPARTMENT ENCOUNTER    CHIEF COMPLAINT  Chief Complaint: Nosebleed  History given by: Patient  History limited by: none  Room Number: 46/46  PMD: Todd Craven MD      HPI:  Pt is a 72 y.o. male who presents complaining of epistaxis that began this morning at 0630. Pt states blood has been running down back of his throat, and denies dizziness and lightheadedness. Pt states he has been self treating nosebleed with nose clamp and ice pack. Pt denies being on blood thinners. Pt's daughter states that pt was on O2 for several hours 3 days ago which she thinks may have dried out pt's nasopharynx.     Duration:  7 hours  Onset: gradual  Timing: constant  Location: nose  Radiation: none  Quality: epistaxis  Intensity/Severity: mild  Progression: unchanged  Associated Symptoms: none  Aggravating Factors: none  Alleviating Factors: none  Previous Episodes: none  Treatment before arrival: Pt has been managing symptoms with ice pack and nose clamp.    PAST MEDICAL HISTORY  Active Ambulatory Problems     Diagnosis Date Noted   • Essential hypertension 10/19/2016   • Alcohol dependence 11/18/2015   • Medicare annual wellness visit, subsequent 08/08/2017   • Depression 08/08/2017   • Chronic gout of multiple sites 08/08/2017   • Arthralgia of multiple joints 08/08/2017   • Generalized osteoarthritis of multiple sites 08/08/2017   • Controlled substance agreement signed 08/08/2017   • Night sweats 08/08/2017   • Nocturia 08/08/2017   • BURCH (dyspnea on exertion) 02/08/2018   • Ear congestion, bilateral 02/08/2018   • Benign paroxysmal positional vertigo due to bilateral vestibular disorder 02/08/2018   • Gross hematuria 02/08/2018   • New onset a-fib/ RVR 02/08/2018   • Shortness of breath 02/08/2018     Resolved Ambulatory Problems     Diagnosis Date Noted   • Neoplasm of meninges 12/14/2015     Past Medical History:   Diagnosis Date   • Abdominal cramping, periumbilical    • Bladder cancer    • Coronary artery  disease    • Depression    • Diabetes mellitus type II, controlled, with no complications    • Essential hypertension, benign    • Gout    • Hyperlipidemia    • Meningioma    • Neoplasm of uncertain behavior of skin    • Osteoarthritis, multiple sites    • Pancreatitis    • Substance abuse    • Tobacco use disorder        PAST SURGICAL HISTORY  Past Surgical History:   Procedure Laterality Date   • BRAIN TUMOR EXCISION Left 2015   • CHOLECYSTECTOMY     • CORONARY STENT PLACEMENT     • KNEE ARTHROSCOPY Left    • SHOULDER ROTATOR CUFF REPAIR Left        FAMILY HISTORY  Family History   Problem Relation Age of Onset   • Depression Mother       at 80   • Pancreatic cancer Father       at 67   • Hypertension Father 40   • Aneurysm Brother      thoracic:   at 59       SOCIAL HISTORY  Social History     Social History   • Marital status:      Spouse name: N/A   • Number of children: N/A   • Years of education: N/A     Occupational History   • Not on file.     Social History Main Topics   • Smoking status: Current Every Day Smoker     Types: Cigars   • Smokeless tobacco: Never Used      Comment: 3-4 daily   • Alcohol use 8.4 oz/week     14 Shots of liquor per week      Comment: Caffeine: coffee- rare; Soda- Sprite 3- 12 oz cans daily   • Drug use: No   • Sexual activity: Defer     Other Topics Concern   • Not on file     Social History Narrative       ALLERGIES  Review of patient's allergies indicates no known allergies.    REVIEW OF SYSTEMS  Review of Systems   Constitutional: Negative for activity change, appetite change and fever.   HENT: Positive for nosebleeds. Negative for congestion and sore throat.    Eyes: Negative.    Respiratory: Negative for cough and shortness of breath.    Cardiovascular: Negative for chest pain and leg swelling.   Gastrointestinal: Negative for abdominal pain, diarrhea and vomiting.   Endocrine: Negative.    Genitourinary: Negative for decreased urine volume  and dysuria.   Musculoskeletal: Negative for neck pain.   Skin: Negative for rash and wound.   Allergic/Immunologic: Negative.    Neurological: Negative for dizziness, weakness, light-headedness, numbness and headaches.   Hematological: Negative.    Psychiatric/Behavioral: Negative.    All other systems reviewed and are negative.      PHYSICAL EXAM  ED Triage Vitals   Temp Heart Rate Resp BP SpO2   02/11/18 1253 02/11/18 1253 02/11/18 1253 02/11/18 1329 02/11/18 1253   98.6 °F (37 °C) 70 16 126/103 93 %      Temp src Heart Rate Source Patient Position BP Location FiO2 (%)   02/11/18 1253 02/11/18 1253 02/11/18 1329 02/11/18 1329 --   Tympanic Monitor Lying Left arm          Physical Exam   Constitutional: He is oriented to person, place, and time and well-developed, well-nourished, and in no distress.   HENT:   Head: Normocephalic and atraumatic.   Nose: Epistaxis (Clot in R nares) is observed.   Pt has blood in posterior oropharynx.    Eyes: EOM are normal. Pupils are equal, round, and reactive to light.   Neck: Normal range of motion. Neck supple.   Cardiovascular: Normal rate and normal heart sounds.  An irregularly irregular rhythm present.   No murmur heard.  Pulmonary/Chest: Effort normal and breath sounds normal. No respiratory distress.   Abdominal: Soft. There is no tenderness. There is no rebound and no guarding.   Musculoskeletal: Normal range of motion. He exhibits no edema.   Neurological: He is alert and oriented to person, place, and time. He has normal sensation and normal strength.   Skin: Skin is warm and dry.   Psychiatric: Mood and affect normal.   Nursing note and vitals reviewed.    PROCEDURES  Epistaxis Management  Date/Time: 2/11/2018 1:28 PM  Performed by: MELISSA ZUÑIGA  Authorized by: MELISSA ZUÑIGA     Consent:     Consent obtained:  Verbal    Consent given by:  Patient    Risks discussed:  Pain  Anesthesia (see MAR for exact dosages):     Anesthesia method:  None  Procedure details:      Treatment site:  R posterior    Treatment method:  Anterior pack (Aleksey-Synephrine, 7.5 cm anterior pack with 5 cc of air)    Treatment complexity:  Limited    Treatment episode: initial    Post-procedure details:     Assessment:  Bleeding decreased    Patient tolerance of procedure:  Tolerated well, no immediate complications  Comments:      1351  Pt rechecked and bleeding is ceased at this time, clamp removed. Informed pt of plan to wait several minutes to determine if bleeding will restart.    1421  Pt rechecked and still dripping blood. Anterior packing applied.   Epistaxis Management  Date/Time: 2/11/2018 4:16 PM  Performed by: MELISSA ZUÑIGA  Authorized by: MELISSA ZUÑIGA     Consent:     Consent obtained:  Verbal    Consent given by:  Patient    Risks discussed:  Pain  Anesthesia (see MAR for exact dosages):     Anesthesia method:  None  Procedure details:     Treatment site:  L posterior, R posterior, R anterior and L anterior    Treatment method:  Anterior pack    Treatment complexity:  Extensive    Treatment episode: recurring    Post-procedure details:     Assessment:  Bleeding decreased    Patient tolerance of procedure:  Tolerated well, no immediate complications          PROGRESS AND CONSULTS  ED Course     1324  Aleksey-Synephrine ordered for epistaxis management.     1423  Upon packing application, discussed plan for discharge to follow up with ENT or PCP in several days to have packing removed. Pt understands and agrees with plan for discharge, all questions addressed.     1518  Pt rechecked and coughing, blood backed up into pt's R eye. Suctioned R clot again. Discussed plan to call ENT to discuss pt's case for further care.     1520  Call placed to ENT.     1544  Discussed pt's case with Dr. Mera (ENT) who suggested packing with saline rather than air, and pack other side if still no improvement.    1548  Air replaced with saline in pt's packing. 7cc saline added.    1615  Pt rechecked and  slightly oozing out of L nares. Discussed plan to pack both nares due to symptoms. Pt states he took his own Norco since last check.     1650  Pt rechecked, bleeding stopped, no blood coming out of pt's eyes. Discussed plan for discharge, and pt was directed to return to ED or visited ENT in 2-3 days to have packing removed. Pt understands and agrees with plan for discharge, all questions addressed.     MEDICAL DECISION MAKING  Results were reviewed/discussed with the patient and they were also made aware of online access. Pt also made aware that some labs, such as cultures, will not be resulted during ER visit and follow up with PMD is necessary.     MDM  Number of Diagnoses or Management Options     Amount and/or Complexity of Data Reviewed  Discuss the patient with other providers: yes (Dr. Mera (ENT))           DIAGNOSIS  Final diagnoses:   Acute anterior epistaxis       DISPOSITION  DISCHARGE    Patient discharged in stable condition.    Reviewed implications of results, diagnosis, meds, responsibility to follow up, warning signs and symptoms of possible worsening, potential complications and reasons to return to ER.    Patient/Family voiced understanding of above instructions.    Discussed plan for discharge, as there is no emergent indication for admission.  Pt/family is agreeable and understands need for follow up and repeat testing.  Pt is aware that discharge does not mean that nothing is wrong but it indicates no emergency is present that requires admission and they must continue care with follow-up as given below or physician of their choice.     FOLLOW-UP  Favio Mear MD  [facility] [address] [facility] [id number] [phone]    Schedule an appointment as soon as possible for a visit           Medication List      New Prescriptions          cephalexin 500 MG capsule   Commonly known as:  KEFLEX   Take 1 capsule by mouth 3 (Three) Times a Day.         Stop          dabigatran etexilate 150 MG  capsu   Commonly known as:  PRADAXA               Latest Documented Vital Signs:  As of 9:42 PM  BP- 133/99 HR- 67 Temp- 98.4 °F (36.9 °C) (Tympanic) O2 sat- 95%    --  Documentation assistance provided by angelo David for Dr. Ojeda.  Information recorded by the scribe was done at my direction and has been verified and validated by me.              Natalie David  02/11/18 2325       Pete Ojeda MD  02/11/18 1710

## 2018-02-18 NOTE — PROGRESS NOTES
Subjective   Steve Montana is a 72 y.o. male.   He is here as new patient for me for hypertension osteoarthritis of multiple sites and idiopathic chronic gout depression dyspnea on exertion bilateral ear congestion and BPPV and gross hematuria and pain  History of Present Illness   He is here as new patient for me for hypertension which is not well-controlled today with a heart rate of 140 along with osteoarthritis multiple sites which is requiring pain medications and not well-controlled and idiopathic chronic gout which is stable at this time and depression which is stable on current medication and dyspnea on exertion which is new and not stable and bilateral ear congestion which she complains of and BPPV which is not stable and gross hematuria which is not stable as well and his pain is from his arthritis multiple sites  The following portions of the patient's history were reviewed and updated as appropriate: allergies, current medications, past family history, past medical history, past social history, past surgical history and problem list.    Review of Systems   Respiratory: Positive for shortness of breath (BURCH).    Genitourinary: Positive for hematuria.   Musculoskeletal: Positive for arthralgias.   All other systems reviewed and are negative.      Objective   Physical Exam   Constitutional: He is oriented to person, place, and time. He appears well-developed and well-nourished. He is cooperative.   HENT:   Head: Normocephalic and atraumatic.   Right Ear: Hearing, tympanic membrane, external ear and ear canal normal.   Left Ear: Hearing, tympanic membrane, external ear and ear canal normal.   Nose: Nose normal.   Mouth/Throat: Uvula is midline, oropharynx is clear and moist and mucous membranes are normal.   Eyes: Conjunctivae, EOM and lids are normal. Pupils are equal, round, and reactive to light.   Neck: Phonation normal. Neck supple. Carotid bruit is not present.   Cardiovascular: Normal heart sounds.   An irregularly irregular rhythm present. Tachycardia present.  Exam reveals no gallop and no friction rub.    No murmur heard.  Pulmonary/Chest: Effort normal and breath sounds normal. No respiratory distress.   Abdominal: Soft. Bowel sounds are normal. He exhibits no distension and no mass. There is no hepatosplenomegaly. There is no tenderness. There is no rebound and no guarding. No hernia.   Musculoskeletal: He exhibits tenderness (multiple joints). He exhibits no edema.   Neurological: He is alert and oriented to person, place, and time. Coordination and gait normal.   Skin: Skin is warm and dry.   Psychiatric: He has a normal mood and affect. His speech is normal and behavior is normal. Judgment and thought content normal.   Nursing note and vitals reviewed.      Assessment/Plan   Diagnoses and all orders for this visit:    Essential hypertension  -     Lipid Panel With LDL / HDL Ratio  -     CBC & Differential  -     Comprehensive Metabolic Panel  -     T4, Free  -     TSH  -     Urinalysis With Microscopic - Urine, Clean Catch  -     PSA DIAGNOSTIC  -     ECG 12 Lead    Generalized osteoarthritis of multiple sites  -     Lipid Panel With LDL / HDL Ratio  -     CBC & Differential  -     Comprehensive Metabolic Panel  -     T4, Free  -     TSH  -     Urinalysis With Microscopic - Urine, Clean Catch  -     PSA DIAGNOSTIC    Idiopathic chronic gout of multiple sites without tophus  -     Lipid Panel With LDL / HDL Ratio  -     CBC & Differential  -     Comprehensive Metabolic Panel  -     T4, Free  -     TSH  -     Urinalysis With Microscopic - Urine, Clean Catch  -     PSA DIAGNOSTIC    Other depression  -     Lipid Panel With LDL / HDL Ratio  -     CBC & Differential  -     Comprehensive Metabolic Panel  -     T4, Free  -     TSH  -     Urinalysis With Microscopic - Urine, Clean Catch  -     PSA DIAGNOSTIC    BURCH (dyspnea on exertion)  -     Adult Transthoracic Echo Complete W/ Cont if Necessary Per Protocol;  Future  -     XR Chest PA & Lateral  -     Lipid Panel With LDL / HDL Ratio  -     CBC & Differential  -     Comprehensive Metabolic Panel  -     T4, Free  -     TSH  -     Urinalysis With Microscopic - Urine, Clean Catch  -     PSA DIAGNOSTIC  -     ECG 12 Lead    Ear congestion, bilateral  -     Lipid Panel With LDL / HDL Ratio  -     CBC & Differential  -     Comprehensive Metabolic Panel  -     T4, Free  -     TSH  -     Urinalysis With Microscopic - Urine, Clean Catch  -     PSA DIAGNOSTIC    Benign paroxysmal positional vertigo due to bilateral vestibular disorder  -     Lipid Panel With LDL / HDL Ratio  -     CBC & Differential  -     Comprehensive Metabolic Panel  -     T4, Free  -     TSH  -     Urinalysis With Microscopic - Urine, Clean Catch  -     PSA DIAGNOSTIC  -     Ambulatory Referral to Physical Therapy Evaluate and treat, Vestibular    Gross hematuria  -     Ambulatory Referral to Urology  -     Lipid Panel With LDL / HDL Ratio  -     CBC & Differential  -     Comprehensive Metabolic Panel  -     T4, Free  -     TSH  -     Urinalysis With Microscopic - Urine, Clean Catch  -     PSA DIAGNOSTIC    Pain  -     HYDROcodone-acetaminophen (NORCO) 7.5-325 MG per tablet; Take 1 tablet by mouth Every 6 (Six) Hours As Needed for Severe Pain .  -     Lipid Panel With LDL / HDL Ratio  -     CBC & Differential  -     Comprehensive Metabolic Panel  -     T4, Free  -     TSH  -     Urinalysis With Microscopic - Urine, Clean Catch  -     PSA DIAGNOSTIC    New onset a-fib/ RVR      Hypertension not well-controlled we will from this point treat and get him admitted to the cardiac care unit  New onset A. fib with RVR based on EKG and his tachycardia he will be admitted to the cardiac care unit immediately  Gross hematuria we will refer to urology for further workup and check labs today  BPPV vestibular exercises when he is ready to get those done  Bilateral ear congestion ears looks good to me today  BURCH is related  to new onset A. fib with RVR and he will be admitted to cardiac care unit  Depression stable on current medication  Idiopathic chronic gout of multiple sites stable on current medication  Generalized osteoarthritis of multiple sites continue current medications and we need to admit him to the care unit
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
## 2018-03-06 NOTE — PROGRESS NOTES
Physical Therapy Daily Progress Note  Visit: 3    Steve Montana reports: I was really tired this weekend.  Last night I did not sleep well and I am really tired this morning.  I have noticed a little improvement with my dizziness. I am not taking the Meclizine as much and not noticing the dizziness as much.      (Pt was 15 mins late for his visit)    Subjective     Objective   See Exercise, Manual, and Modality Logs for complete treatment.       Assessment & Plan     Assessment  Assessment details: Pt seemed fatigued and lethargic today.  Minimal Rx was provided because of those symptoms.  BPPV will be addressed again upon the next visit.  The meclizine continues to be an issue that needs to be addressed by a MD.     Plan  Plan details: Monitor how pt does with vestibular strengthening.                   Manual Therapy:         mins  36755;  Therapeutic Exercise:    15     mins  37287;     Neuromuscular Madelyn:        mins  02947;    Therapeutic Activity:          mins  40303;     Gait Training:           mins  80767;     Ultrasound:          mins  46845;    Electrical Stimulation:         mins  58997 ( );  Dry Needling          mins self-pay    Timed Treatment:   15   mins   Total Treatment:     15   mins    Sam Fontanez PT  [place] Lic. # [id number]  Physical Therapist

## 2018-03-12 PROBLEM — F17.290 CIGAR SMOKER: Status: ACTIVE | Noted: 2018-01-01

## 2018-03-12 PROBLEM — I48.92 ATRIAL FLUTTER (HCC): Status: ACTIVE | Noted: 2018-01-01

## 2018-03-12 NOTE — PROGRESS NOTES
Date of Office Visit: 2018  Encounter Provider: ENDER Laguerre  Place of Service: [facility] CARDIOLOGY  Patient Name: Steve Montana  :1945    Chief Complaint   Patient presents with   • Atrial Fibrillation   • Shortness of Breath   :     HPI: Steve Montana is a 72 y.o. male who presents today for one month cardiac follow-up.  He has any patient to me and his previous records have been reviewed.  He has a past medical history of type 2 diabetes mellitus, essential hypertension, hyperlipidemia, cigar smoker, depression, bladder cancer, coronary artery disease, and status post stent placement.  He reported using 3-4 cigars per day and 3-4 shots of liquor daily.    He presented to our cardiac evaluation center on 18 and was evaluated by Dr. Liz Campos.  He woke up that morning feeling short of breath, weakness, and his heart was racing.  He said he had similar episodes over the past several months.  When he arrived he was in atrial fibrillation with rapid ventricular response.  He informed Dr. Campos that he had recently had influenza and pneumonia.  He reported some mild hematuria.  During his visit and echocardiogram was obtained which revealed an EF of 41%, wall motion abnormalities, left atrium moderately dilated, mildly reduced right ventricular systolic pressure, grade 3 reversible destructive pattern diastolic dysfunction, and moderate LVH.  He was started on Toprol XL 50 mg at nighttime and apixaban 5 mg twice daily.  He was advised to abstain from tobacco and alcohol use.  He was recommended to follow-up with Dr. Lincoln about mild hematuria with his history of bladder cancer.  He was recommended to follow-up with me in one month and to arrange an outpatient stress test at that time.    Mr. Montana presents today for follow-up.  He has a chief concern of shortness of breath upon rest and exertion.  He appears quite dyspneic just sitting here on  the examination table.  He has also noticed some wheezing and dizziness.  As far as the rapid ventricular response is asymptomatic with palpitations and tachycardia.  He denies chest pain, paroxysmal nocturnal dyspnea, orthopnea, edema, or syncope.  He was instructed to take the apixaban and stopped it because of cost.  He then contacted our office and was recommended to start dabigatran and never did so.  He said the following day he went to the emergency department with a nose bleed and has had some nasal issues in the past.  His nose was packed and he is never started anticoagulation again.  Upon review of his medications, he was also taking aspirin and naproxen in combination with the anticoagulant.  He has been having some mild hematuria and is scheduled to have an evaluation in the near future.  He has decrease his cigar smoking to 2 per day and his alcoholic intake to 2 shots daily.    The following portion of the patient's history were reviewed and updated as appropriate: past medical history, past surgical history, past social history, past family history, allergies, current medications, and problem list.    Past Medical History:   Diagnosis Date   • Abdominal cramping, periumbilical    • Bladder cancer    • Coronary artery disease     Stent placement   • Depression    • Diabetes mellitus type II, controlled, with no complications    • Essential hypertension, benign    • Gout    • Hyperlipidemia    • Injury of back    • Meningioma    • Neoplasm of uncertain behavior of skin    • Osteoarthritis, multiple sites    • Pancreatitis    • Shortness of breath    • Sinusitis    • Substance abuse    • Tobacco use disorder        Past Surgical History:   Procedure Laterality Date   • BRAIN TUMOR EXCISION Left 11/2015   • CHOLECYSTECTOMY     • CORONARY STENT PLACEMENT     • KNEE ARTHROSCOPY Left    • SHOULDER ROTATOR CUFF REPAIR Left    • TOTAL HIP ARTHROPLASTY Left 2009       Social History     Social History   •  Marital status:      Spouse name: N/A   • Number of children: N/A   • Years of education: N/A     Occupational History   • Not on file.     Social History Main Topics   • Smoking status: Current Every Day Smoker     Years: 0.00     Types: Cigars   • Smokeless tobacco: Never Used      Comment: 2-5 Daily    • Alcohol use 8.4 oz/week     14 Shots of liquor per week      Comment: Caffeine: coffee- rare; Soda- Sprite 3- 12 oz cans daily   • Drug use: No   • Sexual activity: Defer       Family History   Problem Relation Age of Onset   • Depression Mother       at 80   • Pancreatic cancer Father       at 67   • Hypertension Father 40   • Aneurysm Brother      thoracic:   at 59       Review of Systems   Constitution: Positive for malaise/fatigue. Negative for chills, diaphoresis, fever, night sweats, weight gain and weight loss.   HENT: Positive for nosebleeds. Negative for hearing loss, sore throat and tinnitus.    Eyes: Negative for blurred vision, double vision, pain and visual disturbance.   Cardiovascular: Positive for dyspnea on exertion. Negative for chest pain, claudication, cyanosis, irregular heartbeat, leg swelling, near-syncope, orthopnea, palpitations, paroxysmal nocturnal dyspnea and syncope.   Respiratory: Positive for shortness of breath, snoring and wheezing. Negative for cough and hemoptysis.    Endocrine: Negative for cold intolerance, heat intolerance and polyuria.   Hematologic/Lymphatic: Negative for bleeding problem. Does not bruise/bleed easily.   Skin: Negative for color change, dry skin, flushing and itching.   Musculoskeletal: Positive for joint pain. Negative for falls, joint swelling, muscle cramps, muscle weakness and myalgias.   Gastrointestinal: Negative for abdominal pain, constipation, heartburn, melena, nausea and vomiting.   Genitourinary: Positive for frequency and hematuria. Negative for dysuria.   Neurological: Positive for excessive daytime sleepiness,  "dizziness and light-headedness. Negative for loss of balance, numbness, paresthesias, seizures and vertigo.   Psychiatric/Behavioral: Positive for depression. Negative for altered mental status, memory loss and substance abuse. The patient does not have insomnia and is not nervous/anxious.    Allergic/Immunologic: Negative for environmental allergies.       No Known Allergies      Current Outpatient Prescriptions:   •  allopurinol (ZYLOPRIM) 300 MG tablet, TAKE ONE TABLET BY MOUTH DAILY, Disp: 90 tablet, Rfl: 2  •  aspirin 81 MG EC tablet, Take 81 mg by mouth Daily., Disp: , Rfl:   •  cimetidine (TAGAMET) 800 MG tablet, Take 800 mg by mouth Every Night., Disp: , Rfl:   •  cyanocobalamin ([employer] VITAMIN B-12) 1000 MCG tablet, 1 tablet daily, Disp: 30 tablet, Rfl: 5  •  folic acid (FOLVITE) 1 MG tablet, Take 1 tablet by mouth Daily., Disp: 30 tablet, Rfl: 5  •  hydrochlorothiazide (HYDRODIURIL) 12.5 MG tablet, TAKE ONE TABLET BY MOUTH EVERY MORNING, Disp: 90 tablet, Rfl: 3  •  HYDROcodone-acetaminophen (NORCO) 7.5-325 MG per tablet, Take 1 tablet by mouth Every 6 (Six) Hours As Needed for Severe Pain ., Disp: 120 tablet, Rfl: 0  •  irbesartan (AVAPRO) 300 MG tablet, Take 1 tablet by mouth Daily., Disp: 90 tablet, Rfl: 1  •  meclizine (ANTIVERT) 25 MG tablet, TK 1 T PO  Q  6 H PRF DIZZINES, Disp: , Rfl: 2  •  metoprolol succinate XL (TOPROL-XL) 50 MG 24 hr tablet, Take 1 tablet by mouth Daily., Disp: 90 tablet, Rfl: 3  •  naproxen (NAPROSYN) 500 MG tablet, Take 500 mg by mouth 2 (Two) Times a Day With Meals., Disp: , Rfl:   •  PARoxetine (PAXIL) 20 MG tablet, TAKE ONE TABLET BY MOUTH DAILY, Disp: 90 tablet, Rfl: 1      Objective:     Vitals:    03/12/18 1416 03/12/18 1456 03/12/18 1457   BP: 124/90     Pulse: (!) 124     SpO2:  98% 91%   Weight: 112 kg (246 lb)     Height: 172.7 cm (68\")       Body mass index is 37.4 kg/m².    PHYSICAL EXAM:    Vitals Reviewed.   General Appearance: No acute distress, well developed and " well nourished. Obese.  Eyes: Conjunctiva and lids: No erythema, swelling, or discharge. Sclera non-icteric.   HENT: Atraumatic, normocephalic. External eyes, ears, and nose normal. No hearing loss noted. Mucous membranes normal. Lips not cyanotic. Neck supple with no tenderness.  Respiratory: No signs of respiratory distress. Respiration rhythm and depth normal.   Clear to auscultation. No rales, crackles, rhonchi, or wheezing auscultated.   Cardiovascular:  Jugular Venous Pressure: Normal  Heart Rate and Rhythm: Irregular with rapid ventricular response.  Heart Sounds: Normal S1 and S2. No S3 or S4 noted.  Murmurs: No murmurs noted. No rubs, thrills, or gallops.   Arterial Pulses: Carotid pulses normal. No carotid bruit noted. Posterior tibialis and dorsalis pedis pulses normal.   Lower Extremities: No edema noted.  Gastrointestinal:  Abdomen soft, non-distended, non-tender. Normal bowel sounds. No hepatomegaly.   Musculoskeletal: Normal movement of extremities  Skin and Nails: General appearance normal. No pallor, cyanosis, diaphoresis. Skin temperature normal. No clubbing of fingernails.   Psychiatric: Patient alert and oriented to person, place, and time. Speech and behavior appropriate. Normal mood and affect.       ECG 12 Lead  Date/Time: 3/12/2018 2:10 PM  Performed by: ADELAIDE JEROME  Authorized by: ADELAIDE JEROME   Comparison: compared with previous ECG from 2/7/2018  Comparison to previous ECG: Atrial fibrillation with rapid ventricular response, right bundle branch block  Rhythm: atrial flutter  Rate: tachycardic  BPM: 125  Conduction: right bundle branch block and LAFB  Clinical impression: abnormal ECG  Comments: T-wave abnormalities              Assessment:       Diagnosis Plan   1. Atypical atrial flutter     2. Atrial fibrillation, unspecified type     3. Shortness of breath     4. Left ventricular dysfunction     5. Essential hypertension     6. Alcohol dependence in remission     7. Cigar  smoker     8. Gross hematuria            Plan:       1. Atrial flutter/fibrillation: Mr. Montana presents today for cardiac follow-up.  He appears quite dyspneic just sitting on the examination table and this worsens with exertion.  His baseline O2 at rest was 98% upon walking he became severely dyspneic with saturation dropping to 91%.  His previous EKG showed atrial fibrillation with rapid ventricular response and today is atrial flutter with rapid ventricular response.  At low suspicion that he will respond to increased beta blocker therapy as atrial flutter can be difficult to control the heart rates.  He also has been noncompliant with anticoagulation therapy because of nosebleeds, but he has also been taking aspirin and naproxen in combination with the anticoagulant.  I feel that he needs to be admitted to [facility] for further evaluation and treatment.  I will arrange lead work to be completed, we'll see if we can decrease his heart rates, and give him IV diuresis as a suspect he is having some heart failure symptoms with this decreased ejection fraction.  He may be a candidate for transesophageal echocardiogram and electrical cardioversion.  I discussed the plan care Dr. Aquiles Perez who is in the office today and agrees.  I will notify Dr. Liz Campos as well.    Atrial Fibrillation and Atrial Flutter  Assessment  • The patient has paroxysmal atrial fibrillation and paroxysmal atrial flutter  • The patient's CHADS2-VASc score is 4  • A QET4SA0-CAGi score of 2 or more is considered a high risk for a thromboembolic event    Plan  • Attempt to maintain sinus rhythm  • Continue beta blocker for rate control    2.  Left Ventricular dysfunction: His recent ejection fraction was noted at 41% and he has grade 3 diastolic dysfunction.  His pro BNP level was recently elevated.  We'll check some blood work and arrange for IV diuresis.    3.  Essential Hypertension: His diastolic portion of his blood pressure  remains elevated and further recommendations will be made.    4.  Alcohol dependence and cigar smoker: He's been highly recommended to abstain from both.    5.  Hematuria: He continues to experience hematuria and this will also be a consideration in restarting anticoagulation.    Addendum: I discussed the anticoagulation with Dr. Alvaro Hall.  I informed him that I consulted urology about the hematuria and was concerned about his nosebleeds.  I have discontinued his aspirin and naproxen that he was taking in combination with the apixaban.  Dr. Hall has recommended that we hold off on anticoagulation tonight and we'll further discuss tomorrow.    As always, it has been a pleasure to participate in your patient's care.      Sincerely,         ENDER Maciel

## 2018-03-12 NOTE — PLAN OF CARE
Problem: Patient Care Overview  Goal: Plan of Care Review  Outcome: Ongoing (interventions implemented as appropriate)   03/12/18 1942   Coping/Psychosocial   Plan of Care Reviewed With patient   Plan of Care Review   Progress no change   OTHER   Outcome Summary Patient direct admit for a flutter. Vital signs stable, BP high. IV lopressor ordered. IV lasix ordered. Will administer and reassess BP afterwards. Will continue to monitor.      Goal: Individualization and Mutuality  Outcome: Ongoing (interventions implemented as appropriate)    Goal: Discharge Needs Assessment  Outcome: Ongoing (interventions implemented as appropriate)      Problem: Fall Risk (Adult)  Goal: Identify Related Risk Factors and Signs and Symptoms  Outcome: Outcome(s) achieved Date Met: 03/12/18    Goal: Absence of Fall  Outcome: Ongoing (interventions implemented as appropriate)      Problem: Arrhythmia/Dysrhythmia (Symptomatic) (Adult)  Goal: Signs and Symptoms of Listed Potential Problems Will be Absent, Minimized or Managed (Arrhythmia/Dysrhythmia)  Outcome: Ongoing (interventions implemented as appropriate)

## 2018-03-13 PROBLEM — I48.0 PAROXYSMAL ATRIAL FIBRILLATION (HCC): Status: ACTIVE | Noted: 2018-01-01

## 2018-03-13 NOTE — PROGRESS NOTES
3-13-18  Urology  CC Afib, hematuria  S resting, no pain  O  18   97.7   140/90   100-120  Alert and oriented  HEENT ATNC  Neck supple  Lungs clear  abd soft and nt  Genitalia normal    A Afib     Hematuria on anticoagulation    P  Care directed toward Afib and treatment at this time      Hematuria workup with CT and cystoscopy when overall status improved

## 2018-03-13 NOTE — CONSULTS
Electrophysiology Hospital Consult            Patient Name: Steve Montana  Age/Sex: 72 y.o. male  : 1945  MRN: [id number]    Date of Admission: 3/12/2018  Date of Encounter Visit: 18  Encounter Provider: Johnathon Hernandez MD  Referring Provider: Liz Campos MD  Place of Service: [facility] CARDIOLOGY  Patient Care Team:  Todd Craven MD as PCP - General (Internal Medicine)  ENDER Patel as PCP - Claims Attributed  Karli Whitt RN as Care Coordinator (Population Health)      Subjective:   EP Consultation for: A flutter    Chief Complaint: SOB    CHADS2-VASc score is 4    History of Present Illness:  Steve Montana is a 72 y.o. male with a history of A fib (no AC due to cost, hematuria and epitaxis), DM, HTN, mild CAD with stent in the remote past, smoking, alcohol dependence and hyperlipidemia. An echo was done in February of this year and showed an EF of 41%, grade III diastolic dysfunction and moderately dilated LA cavity.He was seen in the office yesterday for one month f/u A fib. He had c/o sob with rest and exertion, wheezing and dizziness. EKG showed him to be in atrial fib with RVR, rate of 125, along with a RBBB. He was admitted from there. Urology has been consulted for hematuria. He has been restarted on eliquis, with their agreement. Metoprolol 5mg IV and 50mg po have been given, along with Lasix 40mg IV. This morning his rate remains in the 120s, sbp 100. I have been asked to see for cardioversion. Dr. Campos is planning for HANNAH tomorrow with cardioversion to follow.         Palp none  Dur na   Triggers na   Chest pain none   Crawford yes --    Soa yes   Syncope none   Exercise tolerance poor     He's had periods of not taking the apixaban -- because of cost and epistaxis           Cardiac Testing:  Echo 18  · Calculated right ventricular systolic pressure from tricuspid regurgitation is 19 mmHg.  · Left ventricular wall  thickness is consistent with moderate concentric hypertrophy.  · The following left ventricular wall segments are hypokinetic: mid anterior, apical anterior, mid anterolateral, mid anteroseptal and basal anterior.  · Left atrial cavity size is moderately dilated.  · Mildly reduced right ventricular systolic function noted.  · Left ventricular systolic function is mildly decreased. Estimated EF = 41%.  · Left ventricular diastolic dysfunction (grade III) consistent with reversible restrictive pattern.  Cardiac Cath 2006    Cardiac Cath 2004    Past Medical History:  Past Medical History:   Diagnosis Date   • Abdominal cramping, periumbilical    • Bladder cancer    • Coronary artery disease     Stent placement   • Depression    • Diabetes mellitus type II, controlled, with no complications    • Essential hypertension, benign    • Gout    • Hyperlipidemia    • Injury of back    • Meningioma    • Neoplasm of uncertain behavior of skin    • Osteoarthritis, multiple sites    • Pancreatitis    • Shortness of breath    • Sinusitis    • Substance abuse    • Tobacco use disorder        Past Surgical History:   Procedure Laterality Date   • BRAIN TUMOR EXCISION Left 11/2015   • CHOLECYSTECTOMY     • CORONARY STENT PLACEMENT     • KNEE ARTHROSCOPY Left    • SHOULDER ROTATOR CUFF REPAIR Left    • TOTAL HIP ARTHROPLASTY Left 2009       Home Medications:   Prescriptions Prior to Admission   Medication Sig Dispense Refill Last Dose   • allopurinol (ZYLOPRIM) 300 MG tablet TAKE ONE TABLET BY MOUTH DAILY 90 tablet 2 Taking   • aspirin 81 MG EC tablet Take 81 mg by mouth Daily.   Taking   • cimetidine (TAGAMET) 800 MG tablet Take 800 mg by mouth Every Night.   Taking   • cyanocobalamin ([employer] VITAMIN B-12) 1000 MCG tablet 1 tablet daily 30 tablet 5 Taking   • folic acid (FOLVITE) 1 MG tablet Take 1 tablet by mouth Daily. 30 tablet 5 Taking   • hydrochlorothiazide (HYDRODIURIL) 12.5 MG tablet TAKE ONE TABLET BY MOUTH EVERY MORNING 90  tablet 3 Taking   • HYDROcodone-acetaminophen (NORCO) 7.5-325 MG per tablet Take 1 tablet by mouth Every 6 (Six) Hours As Needed for Severe Pain . 120 tablet 0 Taking   • irbesartan (AVAPRO) 300 MG tablet Take 1 tablet by mouth Daily. 90 tablet 1 Taking   • meclizine (ANTIVERT) 25 MG tablet TK 1 T PO  Q  6 H PRF DIZZINES  2 Taking   • metoprolol succinate XL (TOPROL-XL) 50 MG 24 hr tablet Take 1 tablet by mouth Daily. 90 tablet 3 Taking   • naproxen (NAPROSYN) 500 MG tablet Take 500 mg by mouth 2 (Two) Times a Day With Meals.   Taking   • PARoxetine (PAXIL) 20 MG tablet TAKE ONE TABLET BY MOUTH DAILY 90 tablet 1 Taking       Allergies:  No Known Allergies    Past Social History:  Social History     Social History   • Marital status:      Spouse name: N/A   • Number of children: N/A   • Years of education: N/A     Occupational History   • Not on file.     Social History Main Topics   • Smoking status: Current Every Day Smoker     Years: 0.00     Types: Cigars   • Smokeless tobacco: Never Used      Comment: 2 daily    • Alcohol use 8.4 oz/week     14 Shots of liquor per week      Comment: Caffeine: coffee- rare; Soda- Sprite 3- 12 oz cans daily   • Drug use: No   • Sexual activity: Defer     Other Topics Concern   • Not on file     Social History Narrative   • No narrative on file       Past Family History:  Family History   Problem Relation Age of Onset   • Depression Mother       at 80   • Pancreatic cancer Father       at 67   • Hypertension Father 40   • Aneurysm Brother      thoracic:   at 59       Review of Systems: All systems reviewed. Pertinent positives identified in HPI. All other systems are negative.     14 point ROS was performed and is negative except as outlined in HPI.     Objective:     Objective:        ICU Vital Signs     Row Name 18 0904 18 0724 18 0423 18 0030 18 2300       Height and Weight    Weight  --  -- 106 kg (233 lb 2 oz)  --   "--    Weight Method  --  -- Standing scale  --  --       Vitals    Temp  -- 97.9 °F (36.6 °C)  --  -- 97.7 °F (36.5 °C)    Temp src  -- Oral  --  -- Oral    Pulse (!)  124  --  --  -- (!)  126    Heart Rate Source  -- Monitor  --  -- Monitor    Resp  -- 18  --  -- 18    Resp Rate Source  -- Visual  --  --  --    BP  -- 101/80  --  -- 130/89    BP Location  -- Left arm  --  -- Right arm    BP Method  -- Automatic  --  -- Manual    Patient Position  -- Lying  --  --  --       Oxygen Therapy    SpO2  --  --  --  -- 93 %    Pulse Oximetry Type  -- Continuous  --  --  --    Device (Oxygen Therapy)  --  --  -- nasal cannula nasal cannula    Flow (L/min)  --  --  -- 2 2    Row Name 03/12/18 2119 03/12/18 2034 03/12/18 1913 03/12/18 1700 03/12/18 1644       Height and Weight    Height  -- 172.7 cm (68\")  --  --  --    Height Method  -- Stated  --  --  --    Weight  -- 108 kg (238 lb)  --  --  --    Weight Method  -- Standing scale  --  --  --    BSA (Calculated - sq m)  -- 2.2 sq meters  --  --  --    BMI (Calculated)  -- 36.2  --  --  --    Weight in (lb) to have BMI = 25  -- 164.1  --  --  --       Vitals    Temp  --  -- 97.7 °F (36.5 °C)  -- 98.1 °F (36.7 °C)    Temp src  --  -- Oral  -- Oral    Pulse  -- (!)  126 (!)  126 (!)  124  --    Heart Rate Source  -- Monitor Monitor  -- Monitor    Resp  --  -- 18  -- 18    Resp Rate Source  --  -- Visual  -- Visual    BP  -- 128/98 (!)  155/104  -- (!)  142/113    BP Location  -- Left arm Left arm  -- Right arm    BP Method  -- Manual Automatic  -- Automatic    Patient Position  -- Sitting  --  -- Sitting       Oxygen Therapy    SpO2  -- 93 % 91 % 90 %  --    Pulse Oximetry Type  -- Continuous Intermittent  -- Continuous    Device (Oxygen Therapy) nasal cannula room air  --  -- room air    Flow (L/min) 2  --  --  --  --    Row Name 03/12/18 1457 03/12/18 1456 03/12/18 1416             Height and Weight    Height  --  -- 172.7 cm (68\")      Weight  --  -- 112 kg (246 lb)      " BSA (Calculated - sq m)  --  -- 2.23 sq meters      BMI (Calculated)  --  -- 37.4      Weight in (lb) to have BMI = 25  --  -- 164.1         Vitals    Pulse  -- -- (!)  124      BP  --  -- 124/90         Oxygen Therapy    SpO2 91 %   walking 98 %   at rest  --        Temp:  [97.7 °F (36.5 °C)-98.3 °F (36.8 °C)] 98.3 °F (36.8 °C)  Heart Rate:  [] 98  Resp:  [18] 18  BP: (101-155)/() 142/92  Body mass index is 35.45 kg/m².        Physical Exam:   Physical Exam   Constitutional: He is oriented to person, place, and time.   HENT:   Head: Normocephalic and atraumatic.   Eyes: Right eye exhibits no discharge. Left eye exhibits no discharge.   Neck: No JVD present. No thyromegaly present.   Cardiovascular: An irregularly irregular rhythm present. Tachycardia present.  Exam reveals no gallop and no friction rub.    No murmur heard.  Pulmonary/Chest: Effort normal. He has decreased breath sounds. He has rales.   Abdominal: Soft. Bowel sounds are normal. There is no tenderness.   Musculoskeletal: Normal range of motion. He exhibits no edema or deformity.   Neurological: He is alert and oriented to person, place, and time. He exhibits normal muscle tone.   Skin: Skin is warm and dry. No erythema.   Psychiatric: He has a normal mood and affect. His behavior is normal. Thought content normal.        Labs:   Lab Review:       Results from last 7 days  Lab Units 03/13/18  0434 03/12/18  1804   SODIUM mmol/L 142 144   POTASSIUM mmol/L 3.7 3.9   CHLORIDE mmol/L 100 103   CO2 mmol/L 29.4* 33.5*   BUN mg/dL 18 18   CREATININE mg/dL 0.80 0.86   GLUCOSE mg/dL 120* 139*   CALCIUM mg/dL 8.6 8.3*   AST (SGOT) U/L  --  19   ALT (SGPT) U/L  --  12           Results from last 7 days  Lab Units 03/12/18  1804   WBC 10*3/mm3 7.77   HEMOGLOBIN g/dL 14.6   HEMATOCRIT % 46.0   PLATELETS 10*3/mm3 185                       Results from last 7 days  Lab Units 03/12/18  1804   PROBNP pg/mL 9,589.0*                   Imaging:     Imaging  Results (most recent)     Procedure Component Value Units Date/Time    XR Chest PA & Lateral [[id number]] Collected:  03/12/18 1925     Updated:  03/12/18 1932    Narrative:       AP AND LATERAL CHEST     HISTORY: Atrial fibrillation. Difficulty breathing.     COMPARISON: Portable chest 11/26/2006.     FINDINGS: Heart size is enlarged. There are small bilateral pleural  effusions with adjacent basilar atelectasis. There is no evidence for  pulmonary edema or pneumothorax or other focal airspace disease.  Endplate spurring is present in the thoracic spine. Cholecystectomy  clips are present.       Impression:       Cardiomegaly. Small bilateral pleural effusions with basilar  atelectasis.     This report was finalized on 3/12/2018 7:27 PM by Dr. Favio Ayers MD.               EKG:             I personally viewed and interpreted the patient's EKG/Telemetry data.    Assessment:     Active Problems:    Atrial flutter    Paroxysmal atrial fibrillation        Plan:       He has combined heart failure --mostly diastolic but prob a component of systolic hf too.  The cause is both af and aflutter.  He is breathless talking --   He needs diuresis and rate control.   This will turn things around  Not ready to be sedated and cardioverted.   He can't afford NOAC so we should go w warfarin/  Also, until he is out of hf, and treated w sleep apnea the AF will come right back.   He is obese and has mostly abdominal obesity -- almost certainly has jeremy     Already, w bb and dig his heart rate is in the 90s.     I've stopped the ARB because it's not possible to control the rate when BP is being lowered by losartan.     Needs at least a 5 day admission, pulm consult and weight loss counseling.     Thank you for allowing me to participate in the care of Steve Montana. Feel free to contact me directly with any further questions or concerns.    Johnathon Hernandez MD  [facility]  03/13/18  6:29 PM

## 2018-03-13 NOTE — CONSULTS
First Urology Hematuria Consult      Assessment    72 y.o.y.o. male with gross hematuria likley due to prostatic origin but work-up incomplete.     Plan    He will need to complete his evaluation in our office as oupatient as previously set up by Dr. Guajardo that was to be done Tuesday 3/13 which can be rescheduled.        Steve Montana is a 72 y.o.y.o. male who was admitted for No chief complaint on file.  . Patient was referred by Dr. Taylor for evaluation and treatment of hematuria. Hematuria is not associated with pain. Blood appears throughout the stream. Patient is also not passing clots. Patient admits to history of no risk factors for cancer. Patient denies history of previous infection, urolithiasis,  trauma,  cancer,  surgery, sexually transmitted diseases and chronic Ren catheter. Patient has been able to void.  Patient has ?cc on bladder scan PVR. Currently has no catheter. Prior workup included  cytology, with significant findings being pending.    Outside records reviewed. Pertinent radiology and labs reviewed.    Review of Systems  Respiratory: positive for dyspnea on exertion  Cardiovascular: positive for irregular heart beat  Genitourinary:positive for decreased stream, frequency and hematuria         Physicial Exam  BP (!) 155/104 (BP Location: Left arm)   Pulse (!) 126   Temp 97.7 °F (36.5 °C) (Oral)   Resp 18   SpO2 91%     General Appearance:    Alert, cooperative, no distress, appears stated age   Head:    Normocephalic, without obvious abnormality, atraumatic   Eyes:    PERRL, conjunctiva/corneas clear, EOM's intact, fundi     benign, both eyes        Ears:    Normal external ear canals, both ears   Nose:   Nares normal, septum midline, mucosa normal, no drainage    or sinus tenderness   Throat:   Lips, mucosa, and tongue normal; teeth and gums normal   Neck:   Supple, symmetrical   Back:     Symmetric, no curvature, ROM normal, no CVA tenderness   Lungs:     Clear to  auscultation bilaterally, respirations unlabored   Chest wall:    No tenderness or deformity   Heart:    Irregular   Abdomen:     Soft, non-tender, bowel sounds active all four quadrants,     no masses   Genitalia:    Normal male without lesion, discharge or tenderness   Rectal:    Normal tone, normal prostate, no masses or tenderness;      Extremities:   Extremities normal, atraumatic, no cyanosis or edema   Pulses:   2+ and symmetric all extremities   Skin:   Skin color, texture, turgor normal, no rashes or lesions   Lymph nodes:   Cervical, supraclavicular, and axillary nodes normal   Neurologic:   CNII-XII intact       Imaging  none    Labs  Urine Microscopy:       Invalid input(s): LABCAST, Urinalysis:   , Urine Culture:   , BMP:   Results from last 7 days  Lab Units 03/12/18  1804   SODIUM mmol/L 144   POTASSIUM mmol/L 3.9   CALCIUM mg/dL 8.3*   CHLORIDE mmol/L 103   CO2 mmol/L 33.5*   BUN mg/dL 18   CREATININE mg/dL 0.86   ALBUMIN g/dL 3.30*   BILIRUBIN mg/dL 1.0   ALK PHOS U/L 60    and CBC:   Results from last 7 days  Lab Units 03/12/18  1804   WBC 10*3/mm3 7.77   RBC 10*6/mm3 4.36*   HEMOGLOBIN g/dL 14.6   HEMATOCRIT % 46.0   PLATELETS 10*3/mm3 185

## 2018-03-13 NOTE — CONSULTS
Adult Nutrition  Assessment/PES    Patient Name:  Steve Montana  YOB: 1945  MRN: [id number]  Admit Date:  3/12/2018    Assessment Date:  3/13/2018    Comments:        Reason for Assessment    Reason For Assessment nurse/nurse practitioner consult;identified at risk by screening criteria    Diagnosis cardiac disease   A-flutter; underlying heart disease + bladder ca; use of tobacco and ETOH daily    Identified At Risk by Screening Criteria MST SCORE 2+;unintentional loss of 10 lbs or more in the past 2 mos             Adult Nutrition Assessment     Row Name 03/13/18 0944       Physical Findings    Overall Physical Appearance obese;shortness of breath    Skin other (see comments)   intact jackeline 20       Nutrition Prescription PO    Common Modifiers Cardiac    Row Name 03/13/18 0939       Nutrition/Diet History    Factors Affecting Nutritional Intake other (see comments)   General malaise - recent flu/pna. Poor sleep noted    Row Name 03/13/18 0938       Admit Weight    Admit Weight    108 kg (238 lb)  Current wt 233 lb       Usual Body Weight (UBW)    Usual Body Weight 109 kg (240 lb)    Weight Loss unintentional    Weight Loss Time Frame pt states 10-15 lb lost; records show 5-7 lb.        Body Mass Index (BMI)    BMI Assessment BMI 35-39.9: obesity grade II       Labs/Procedures/Meds    Lab Results Reviewed reviewed, pertinent    Lab Results Comments BNP ^; to have HANNAH & cardioversion    Row Name 03/13/18 0932                          Row Name 03/13/18 0423                      Problem/Interventions:        Problem 1     Row Name 03/13/18 0945       Nutrition Diagnoses Problem 1    Problem 1 Nutrition Appropriate for Condition at this Time    Etiology (related to) MNT for Treatment/Condition              Intervention Goal     Row Name 03/13/18 0908       Intervention Goal    General Maintain nutrition;Disease management/therapy;Reduce/improve symptoms    PO PO intake (%)    PO Intake % 75 %     Weight Appropriate weight loss            Nutrition Intervention     Row Name 03/13/18 0945       Nutrition Intervention    RD/Tech Action Interview for preference;Encourage intake;Care plan reviewd;Follow Tx progress              Education/Evaluation     Row Name 03/13/18 0946       Education    Education Will Instruct as appropriate       Monitor/Evaluation    Monitor Per protocol        Electronically signed by:  Cynthia Holliday RD  03/13/18 9:46 AM

## 2018-03-13 NOTE — CONSULTS
"I introduced myself, educated pt about my role with psychiatry, and asked if he would like to speak with me, pt replied, \"sure, sometimes I drink too much, and every once in a while I get to feeling bad\". Asked about feeling bad, reports that sometimes, \"I get to wishing that I could not wake up, but as far as being suicidal no, I'm too big a chicken for that.\" Denies any hx of suicide attempts of intentional self harm bx. Denies hx of admission to inpatient psych. Denies thinking about ways to harm himself, or of having intentions to act on these thoughts.     States primary stressor is that he experiences chronic pain and that on 1/22/18, he went in for MRI, and he felt really good that it hadn't come back, but that he felt cold during the procedure, went home, climbed in bed, and that he woke up feeling cold, sweating, feels bad, can't get his breath, and that this was one of these moments that he thought, \"hey if it's going to be like this I don't want it\".     I asked pt if he believed that he could benefit from talking to a mental health professional, such as a counselor or psychiatrist, pt replied, \"I doubt it\".     Denies any recent thoughts of harming others.     Reports that he lives alone, but has regular contact with family and adequate social support. Reports he had been working a few nights a week as a an  [employer], up until 1/22/18. Reports that not working has been an adjustment for him, but that he just doesn't feel able to do so physically. Noted to get somewhat winded going to his bathroom and back to chair.     Reports he has been drinking 1-2 drinks daily, had previously been drinking up to 4 drinks daily, and that \"it just doesn't taste as good lately\". Denies thinking that his drinking has become a problem for him. Denies any street drug use.     Noted to be affable and pleasant during interview.     Declines outpatient mental health referrals.     I asked pt if he would " "like to be on the [facility] f/u list so that someone from [facility] could come check in with him periodically while he is here in the hospital, pt replied, \"probably not, I'll probably just be here for a day or 2\"    Plan to clear from psychiatric perspecitve.     I discussed interview with pt's RNJackie and encouraged nursing staff to call [facility] with any changes in condition, or should pt decide he could benefit from additional mental health resources in the future.         "

## 2018-03-13 NOTE — PROGRESS NOTES
LOS: 1 day   Patient Care Team:  Todd Craven MD as PCP - General (Internal Medicine)  ENDER Patel as PCP - Claims Attributed  Karli Whitt, RN as Care Coordinator (Population Health)    Chief Complaint:  F/u chf and atrial flutter.      Interval History:      his breathing is better.  He has no chest pain.  He doesn't feel his heart racing.  He has no dizziness or nausea.  Even though he did urinate quite a bit, he still feels short winded.  He is very sleepy this morning.  He has been having gross hematuria and urology artery consult on him.  He has been off the apixaban since February 11, 2018 when he presented to the emergency department with epistaxis.      Echo 2/8/18  · Calculated right ventricular systolic pressure from tricuspid regurgitation is 19 mmHg.  · Left ventricular wall thickness is consistent with moderate concentric hypertrophy.  · The following left ventricular wall segments are hypokinetic: mid anterior, apical anterior, mid anterolateral, mid anteroseptal and basal anterior.  · Left atrial cavity size is moderately dilated.  · Mildly reduced right ventricular systolic function noted.  · Left ventricular systolic function is mildly decreased. Estimated EF = 41%.  · Left ventricular diastolic dysfunction (grade III) consistent with reversible restrictive pattern.      Objective   Vital Signs  Temp:  [97.7 °F (36.5 °C)-98.1 °F (36.7 °C)] 97.7 °F (36.5 °C)  Heart Rate:  [124-126] 126  Resp:  [18] 18  BP: (124-155)/() 130/89    Intake/Output Summary (Last 24 hours) at 03/13/18 0725  Last data filed at 03/12/18 2120   Gross per 24 hour   Intake              600 ml   Output              650 ml   Net              -50 ml       Comfortable NAD  Neck supple,can't assess JVD or thyromegaly due to thick neck  S1/S2 regular and tachy, no m/r/g  Lungs CTA B except bases, tachypnea  Abdomen S/NT/ND (+) BS, no HSM appreciated  Extremities warm, no clubbing, cyanosis, or edema  No  visible or palpable skin lesions  A/Ox4, mood and affect appropriate    Results Review:        Results from last 7 days  Lab Units 03/13/18  0434 03/12/18  1804   SODIUM mmol/L 142 144   POTASSIUM mmol/L 3.7 3.9   CHLORIDE mmol/L 100 103   CO2 mmol/L 29.4* 33.5*   BUN mg/dL 18 18   CREATININE mg/dL 0.80 0.86   GLUCOSE mg/dL 120* 139*   CALCIUM mg/dL 8.6 8.3*           Results from last 7 days  Lab Units 03/12/18  1804   WBC 10*3/mm3 7.77   HEMOGLOBIN g/dL 14.6   HEMATOCRIT % 46.0   PLATELETS 10*3/mm3 185                       I reviewed the patient's new clinical results.  I personally viewed and interpreted the patient's EKG/Telemetry data        Medication Review:     allopurinol 300 mg Oral Daily   folic acid 1 mg Oral Daily   losartan 100 mg Oral Q24H   metoprolol succinate XL 50 mg Oral Q12H   PARoxetine 20 mg Oral Daily            Assessment/Plan     Active Problems:    Atrial flutter    1. Atrial flutter - needs ras tomorrow with cardioversion.  Will consult EPS to see this afternoon.  Will restart apixaban.   2. chf due to tachycardia.   3. DM  4. Hypertension.   5. Hyperlipidemia.   6. Gross hematuria, Dr. Carpio from urology consulted.  Likely prostatic origin.    7. Likely jeremy, consult pulmonary.   8. Known CMP, EF 40%.     RAS and cardioversion in am.Spouse with patient risks and benefits of RAS and cardioversion.  I also discussed with him about the risks of anticoagulation giving the epistaxis and hematuria.  He is agreeable to proceed with the planned procedure.  He would like to be on something besides apixaban due to cost.  I would like to supply with apixaban samples until I can switch him over to warfarin maybe in 4-6 weeks.  I am going to go ahead and consult Dr. Peres was seen this afternoon to help with management of this rhythm problem because I think that the atrial fibrillation and atrial flutter couldn't going to continue to be a problem for him.  I did speak with Dr. King Carpio  from urology.  He is okay with using any form of anticoagulation knowing that it probably will make his hematuria worse. Appreciate his input.      Liz Campos MD  03/13/18  7:25 AM

## 2018-03-13 NOTE — PLAN OF CARE
Problem: Patient Care Overview  Goal: Plan of Care Review  Outcome: Ongoing (interventions implemented as appropriate)   03/13/18 0435   Coping/Psychosocial   Plan of Care Reviewed With patient   Plan of Care Review   Progress no change   OTHER   Outcome Summary pt is alert and oriented, 2L O2 nasal cannula applied, complaining of back/bilateral knee pain, medicated PRN, looks to be in and out of aflutter and ST, AM EKG, no falls, continue to monitor     Goal: Individualization and Mutuality  Outcome: Ongoing (interventions implemented as appropriate)    Goal: Discharge Needs Assessment  Outcome: Ongoing (interventions implemented as appropriate)      Problem: Fall Risk (Adult)  Goal: Absence of Fall  Outcome: Ongoing (interventions implemented as appropriate)

## 2018-03-13 NOTE — PLAN OF CARE
Problem: Patient Care Overview  Goal: Plan of Care Review   03/13/18 6114   Coping/Psychosocial   Plan of Care Reviewed With patient   Plan of Care Review   Progress no change   OTHER   Outcome Summary patient denies any complaints other than back pain that is relieved by PRN medications. patient is to be seen by dr. zimmerman today. patient remains in aflutter. will continue to monitor. plan is to be NPO at midnight.

## 2018-03-13 NOTE — SIGNIFICANT NOTE
03/13/18 1441   Rehab Time/Intention   Evaluation Not Performed other (see comments)  (No MD order noted .OT consulted by sherry.  Discussed with nurse who states no OT indicated.  Will not follow for OT at this time)   Rehab Treatment   Discipline occupational therapist

## 2018-03-13 NOTE — NURSING NOTE
Clarified with Dr. Hernandez to have coumadin and eliquis given until an INR greater than 1.8 is achieved.

## 2018-03-14 NOTE — PROGRESS NOTES
Patient seen, discussed with nurse at bedside, chart reviewed.  He did not get any rest at all last night.  He was seen in consultation by Dr. Ireland in the middle of the night.  He will go for PFT testing today.  He will have overnight oximetry testing tomorrow night.  He will have auto BiPAP tonight.  All the results of these tests followed up tomorrow by Dr. Connors.

## 2018-03-14 NOTE — PLAN OF CARE
Problem: Patient Care Overview  Goal: Plan of Care Review  Outcome: Ongoing (interventions implemented as appropriate)   03/14/18 0308   Coping/Psychosocial   Plan of Care Reviewed With patient   Plan of Care Review   Progress no change   OTHER   Outcome Summary Pt remains in Aflutter, 3L NC, respirations between 26 and 32. SOA with exertion and at rest. 2x 250mcg Digoxin, IV diuretic and new anticoagulant given. Pt has poor compliance with requested output monitoring. NPO after midnight. Back pain relieved with PO meds. Will continue to monitor.      Goal: Individualization and Mutuality  Outcome: Ongoing (interventions implemented as appropriate)   03/12/18 1942   Individualization   Patient Specific Preferences Prefers to be called Mo     Goal: Discharge Needs Assessment  Outcome: Ongoing (interventions implemented as appropriate)   03/12/18 1927   Disability   Equipment Currently Used at Home cane, straight     Goal: Interprofessional Rounds/Family Conf  Outcome: Ongoing (interventions implemented as appropriate)      Problem: Fall Risk (Adult)  Goal: Absence of Fall  Outcome: Ongoing (interventions implemented as appropriate)   03/14/18 0308   Fall Risk (Adult)   Absence of Fall making progress toward outcome       Problem: Arrhythmia/Dysrhythmia (Symptomatic) (Adult)  Goal: Signs and Symptoms of Listed Potential Problems Will be Absent, Minimized or Managed (Arrhythmia/Dysrhythmia)  Outcome: Ongoing (interventions implemented as appropriate)   03/14/18 0308   Goal/Outcome Evaluation   Problems Assessed (Arrhythmia/Dysrhythmia) all   Problems Present (Dysrhythmia) hypoxia/hypoxemia;electrophysiologic conduction defect;situational response

## 2018-03-14 NOTE — CONSULTS
[facility]  Phone: [phone]  Mario Ireland MD      Subjective   LOS: 2 days     Thank you for this consultation.  72-year-old male who is admitted for management of atrial fibrillation.  We were consulted for possible sleep apnea.   has snoring respiration with witnessed apneas at night.  He wakes up feeling mostly rested.  He is quite active during the day.  However if he sits down quietly he does tend to get drowsy.  He denies any recent major weight change.    In addition to above patient reports at least 3 months of shortness of air and wheezing.  He denies any leg edema.  He is not on oxygen or inhalers.  He has been a smoker of 4 cigars per day though he recently cut down to 2 cigars per day.  He reports increased exertional dyspnea lately though he states that prior to 3 months ago he had no limitation from shortness of breath.  He denies any cough or phlegm fever chills or rigors or chest pain.    His previous medical history includes diabetes, hypertension, coronary artery disease with a stent.  He had a brain tumor removed in 2015 and is followed at [facility].  I believe this was a meningioma.  He has a history of bladder cancer.     I have reviewed and edited the Past Medical History, Past Surgical History, Home Medications, Social History and Family History as of 6:10 AM on 03/14/18.    Prescriptions Prior to Admission   Medication Sig Dispense Refill Last Dose   • allopurinol (ZYLOPRIM) 300 MG tablet TAKE ONE TABLET BY MOUTH DAILY 90 tablet 2 Taking   • aspirin 81 MG EC tablet Take 81 mg by mouth Daily.   Taking   • cimetidine (TAGAMET) 800 MG tablet Take 800 mg by mouth Every Night.   Taking   • cyanocobalamin ([employer] VITAMIN B-12) 1000 MCG tablet 1 tablet daily 30 tablet 5 Taking   • folic acid (FOLVITE) 1 MG tablet Take 1 tablet by mouth Daily. 30 tablet 5 Taking   • hydrochlorothiazide (HYDRODIURIL) 12.5 MG tablet TAKE ONE TABLET BY MOUTH EVERY MORNING 90 tablet 3 Taking   •  HYDROcodone-acetaminophen (NORCO) 7.5-325 MG per tablet Take 1 tablet by mouth Every 6 (Six) Hours As Needed for Severe Pain . 120 tablet 0 Taking   • irbesartan (AVAPRO) 300 MG tablet Take 1 tablet by mouth Daily. 90 tablet 1 Taking   • meclizine (ANTIVERT) 25 MG tablet TK 1 T PO  Q  6 H PRF DIZZINES  2 Taking   • metoprolol succinate XL (TOPROL-XL) 50 MG 24 hr tablet Take 1 tablet by mouth Daily. 90 tablet 3 Taking   • naproxen (NAPROSYN) 500 MG tablet Take 500 mg by mouth 2 (Two) Times a Day With Meals.   Taking   • PARoxetine (PAXIL) 20 MG tablet TAKE ONE TABLET BY MOUTH DAILY 90 tablet 1 Taking     No Known Allergies    Review of Systems   Constitutional: Negative for chills and fever.   HENT: Negative for congestion and sore throat.    Respiratory: Positive for shortness of breath and wheezing.    Cardiovascular: Negative for chest pain and leg swelling.   Gastrointestinal: Negative for nausea and vomiting.   Genitourinary: Negative for dysuria and hematuria.   Musculoskeletal: Negative for arthralgias and back pain.   Skin: Negative for pallor and rash.   Neurological: Negative for seizures and headaches.   Psychiatric/Behavioral: Negative for confusion and hallucinations.       Vital Signs past 24hrs  BP range: BP: (101-157)/() 144/97  Pulse range: Heart Rate:  [] 65  Resp rate range: Resp:  [18-32] 26  Temp range: Temp (24hrs), Av.5 °F (36.9 °C), Min:97.9 °F (36.6 °C), Max:99.3 °F (37.4 °C)    Oxygen range: SpO2:  [91 %-95 %] 95 %; Flow (L/min):  [2-3] 2;   Device (Oxygen Therapy): nasal cannula  104 kg (228 lb 5 oz); Body mass index is 34.71 kg/m².  I/O this shift:  In: -   Out: 900 [Urine:900]    Adult male who appears to be in mild respiratory distress.  His pupils are equal and reactive to light.  He has a class IV Mallampati airway.  Moist oropharynx.  No posterior pharyngeal discharge.  Nasopharynx without discharge septum midline.  JVP not elevated to my evaluation.  Trachea midline  thyroid not enlarged.  Lungs reveal bilateral air entry diminished breath sounds.  I did not hear adventitious breath sounds.  Percussion note resonant chest expansion equal no chest wall deformities or tenderness.  Heart examination S1-S2 present.  Rhythm is regular no murmurs.  No edema lower extremities.  Abdomen is obese, soft, nontender.  Bowel sounds present no liver spleen enlargement.  No peripheral cyanosis clubbing.  Patient moves all 4 extremities sensory motor intact.  No cervical, axillary, inguinal adenopathy.    Results Review:    I have reviewed the laboratory and imaging data from current admission. My annotations are as noted in assessment and plan.    Medication Review:  I have reviewed the current MAR. My annotations are as noted in assessment and plan.    Plan   PCCM Problems  Risk factors for obstructive sleep apnea with snoring and witnessed apneas and hypersomnolence  Obesity  Dyspnea  Acute hypoxic respiratory failure  Suspected COPD  Relevant Medical Diagnoses  Current smoker of cigars  Atrial fibrillation  History CAD    Plan of Treatment  Patient has risk factors for obstructive sleep apnea.  A sleep study was ordered and will be reviewed to see if he is a candidate for CPAP therapy.  In the meantime he may warrant nocturnal oxygen therapy.  Tells me that he is going home on Friday and I have therefore ordered a sleep oximetry test on Thursday night.  Based on results he should be set up with oxygen on discharge if he qualifies.  Risks of untreated sleep apnea in terms of cardiovascular health were explained to the patient.    Patient appears to be in mild respiratory distress at least even just sitting on the bed.  His air entry is diminished though this could be due to obesity.  Given history of smoking I will go ahead and order PFTs and ABG.  My partner in morning will review these to see if he warrants treatment for COPD.      Part of this note may be an electronic  transcription/translation of spoken language to printed text using the Dragon Dictation System.

## 2018-03-14 NOTE — PROGRESS NOTES
LOS: 2 days   Patient Care Team:  Todd Craven MD as PCP - General (Internal Medicine)  ENDER Paetl as PCP - Claims Attributed  Karli Whitt RN as Care Coordinator (Population Health)    Chief Complaint:   f/u chf and atrial flutter    Interval History:   Breathing is better but not to baseline.  No cp, nausea or dizziness.      Objective   Vital Signs  Temp:  [98.3 °F (36.8 °C)-99.3 °F (37.4 °C)] 99.3 °F (37.4 °C)  Heart Rate:  [] 65  Resp:  [18-32] 26  BP: (128-157)/() 139/86    Intake/Output Summary (Last 24 hours) at 03/14/18 0724  Last data filed at 03/14/18 0607   Gross per 24 hour   Intake              840 ml   Output              900 ml   Net              -60 ml       Comfortable NAD  Neck supple, no JVD or thyromegaly appreciated  S1/S2 RRR, no m/r/g  Lungs decreased throughout, normal effort  Abdomen S/NT/ND (+) BS, no HSM appreciated  Extremities warm, no clubbing, cyanosis, or edema  No visible or palpable skin lesions  A/Ox4, mood and affect appropriate    Results Review:        Results from last 7 days  Lab Units 03/14/18 0413 03/13/18 2014 03/13/18  0434   SODIUM mmol/L 143 144 142   POTASSIUM mmol/L 4.3 4.4 3.7   CHLORIDE mmol/L 98 102 100   CO2 mmol/L 34.3* 32.2* 29.4*   BUN mg/dL 18 20 18   CREATININE mg/dL 0.89 0.96 0.80   GLUCOSE mg/dL 105* 118* 120*   CALCIUM mg/dL 8.5* 8.2* 8.6           Results from last 7 days  Lab Units 03/14/18 0413 03/12/18  1804   WBC 10*3/mm3 8.48 7.77   HEMOGLOBIN g/dL 15.4 14.6   HEMATOCRIT % 48.8 46.0   PLATELETS 10*3/mm3 172 185       Results from last 7 days  Lab Units 03/14/18 0413 03/13/18 2014   INR  1.24* 1.21*           Results from last 7 days  Lab Units 03/14/18 0413   MAGNESIUM mg/dL 1.5*           I reviewed the patient's new clinical results.  I personally viewed and interpreted the patient's EKG/Telemetry data        Medication Review:     allopurinol 300 mg Oral Daily   apixaban 5 mg Oral Q12H   bumetanide 3 mg  Intravenous Q12H   folic acid 1 mg Oral Daily   metoprolol tartrate 100 mg Oral Q8H   PARoxetine 20 mg Oral Daily   potassium chloride 40 mEq Oral Q12H   spironolactone 25 mg Oral Daily   warfarin 5 mg Oral Daily            Assessment/Plan     Active Problems:    Atrial flutter    Paroxysmal atrial fibrillation       1. Atrial flutter - on warfarin - target INR is 2-3. Heart rate better on lopressor.  Received digoxin IV yesterday.   2. chf due to tachycardia.   3. DM  4. Hypertension.   5. Hyperlipidemia.   6. Gross hematuria, Dr. Carpio from urology consulted.  Likely prostatic origin.    7. Likely jeremy, consult pulmonary.   8. Known CMP, EF 40%.     Continue iv bumex and add losartan.  Consult nutrition for education.      iLz Campos MD  03/14/18  7:24 AM

## 2018-03-14 NOTE — PROGRESS NOTES
Pharmacy consult for Lovenox dosing    Steve Montana is a 72 y.o. male 104 kg (228 lb 5 oz).    Pharmacy consulted to dose for A.fib requiring full anticoagulation per Dr Bartlett's request.    Estimated Creatinine Clearance: 88.9 mL/min (by C-G formula based on SCr of 0.89 mg/dL).  Creatinine   Date Value Ref Range Status   03/14/2018 0.89 0.76 - 1.27 mg/dL Final   03/13/2018 0.96 0.76 - 1.27 mg/dL Final   03/13/2018 0.80 0.76 - 1.27 mg/dL Final   03/12/2018 0.86 0.76 - 1.27 mg/dL Final     Platelets   Date Value Ref Range Status   03/14/2018 172 140 - 500 10*3/mm3 Final   03/12/2018 185 140 - 500 10*3/mm3 Final     Lab Results   Component Value Date    HGB 15.4 03/14/2018    HGB 14.6 03/12/2018    HGB 15.1 02/08/2018     Lab Results   Component Value Date    INR 1.24 (H) 03/14/2018    INR 1.21 (H) 03/13/2018       PLAN:  Pt previously on apixaban 5mg PO BID, last dose 3/13 @ 2112. Changing to warfarin due to cost issues which was started last night. Will initiate enoxaparin 1mg/kg SQ q12h this AM. Pharmacy will continue to monitor and adjust as needed until discontinued.    Heidy Palafox, PharmD, BCPS  03/14/18 8:41 AM

## 2018-03-14 NOTE — CONSULTS
Adult Nutrition  Assessment/PES    Patient Name:  Steve Montana  YOB: 1945  MRN: [id number]  Admit Date:  3/12/2018    Assessment Date:  3/14/2018    Comments:  Low sodium diet information provided and reviewed with pt.           Adult Nutrition Assessment     Row Name 03/14/18 1405       Reason for Assessment    Reason For Assessment physician consult    Diagnosis cardiac disease   heart failure    Identified At Risk by Screening Criteria need for education       Nutrition/Diet History    Typical Food/Fluid Intake doesn't salt his food at home; chooses baked over fried foods if dining out.           Problem/Interventions:          Problem 2     Row Name 03/14/18 1405       Nutrition Diagnoses Problem 2    Problem 2 Knowledge Deficit    Etiology (related to) Medical Diagnosis    Cardiac CHF    Signs/Symptoms (evidenced by) Potential Information Deficit                  Intervention Goal     Row Name 03/14/18 1413       Intervention Goal    General Provide information regarding MNT for treatment/condition                Education/Evaluation     Row Name 03/14/18 1413       Education    Education Provided education regarding;Education topics;Advised regarding habits/behavior    Provided education regarding Diet rationale    Education Topics Na+;CHF    Na+ (mg/day) 2000 mg/day    Advised Regarding Habits/Behavior Eating out;Food choices;Food shopping;Seasoning food       Monitor/Evaluation    Monitor Per protocol    Education Follow-up Reinforce PRN        Electronically signed by:  Cynthia Holliday RD  03/14/18 2:13 PM

## 2018-03-15 NOTE — PLAN OF CARE
Problem: Patient Care Overview  Goal: Plan of Care Review  Outcome: Ongoing (interventions implemented as appropriate)   03/15/18 0237   Coping/Psychosocial   Plan of Care Reviewed With patient   Plan of Care Review   Progress improving   OTHER   Outcome Summary Pt remains in Aflutter, appears rate controlled at 60-70 bpm. Respiration rate and SOA improving. Overnight sleep test. Continues IV Bumex, frequent urination and poor sleep. Will continue to monitor.        Problem: Fall Risk (Adult)  Goal: Absence of Fall  Outcome: Ongoing (interventions implemented as appropriate)   03/15/18 0237   Fall Risk (Adult)   Absence of Fall making progress toward outcome       Problem: Arrhythmia/Dysrhythmia (Symptomatic) (Adult)  Goal: Signs and Symptoms of Listed Potential Problems Will be Absent, Minimized or Managed (Arrhythmia/Dysrhythmia)  Outcome: Ongoing (interventions implemented as appropriate)   03/15/18 0237   Goal/Outcome Evaluation   Problems Assessed (Arrhythmia/Dysrhythmia) all   Problems Present (Dysrhythmia) hypoxia/hypoxemia;electrophysiologic conduction defect;situational response

## 2018-03-15 NOTE — PROGRESS NOTES
LOS: 3 days   Patient Care Team:  Todd Craven MD as PCP - General (Internal Medicine)  ENDER Patel as PCP - Claims Attributed  Karli Whitt RN as Care Coordinator (Population Health)    Subjective     Patient reports he is feeling dramatically better his shortness of breath has markedly improved.  He says he can lay down and sleep now.  No chest pain no cough    Review of Systems:          Objective     Vital Signs  Vital Sign Min/Max for last 24 hours  Temp  Min: 97.6 °F (36.4 °C)  Max: 99 °F (37.2 °C)   BP  Min: 99/81  Max: 135/90   Pulse  Min: 64  Max: 93   Resp  Min: 18  Max: 20   SpO2  Min: 90 %  Max: 95 %   Flow (L/min)  Min: 2  Max: 3   No Data Recorded        Ventilator/Non-Invasive Ventilation Settings     Start     Ordered    03/14/18 1400  . AutoBIPAP; Titrate for SPO2: Per Policy  At Bedtime & As Needed-RT     Question Answer Comment   . AutoBIPAP    Titrate for SPO2 Per Policy        03/13/18 2108                       Body mass index is 34.71 kg/m².  I/O last 3 completed shifts:  In: -   Out: 1401 [Urine:1401]  No intake/output data recorded.        Physical Exam:  General Appearance: Well-developed obese white male was sleeping on my arrival but easily awakened in no apparent distress  Eyes: Conjunctiva are clear and anicteric  ENT: Oral and nasal mucous membranes are little dry no erythema or exudates he has a Mallampati type III airway  Neck: Fairly large trachea is midline I don't appreciate jugular venous distention or hepatojugular reflux is no palpable adenopathy or thyromegaly  Lungs: Clear I don't hear any wheezes rales or rhonchi nonlabored symmetric expansion.  On percussion there is a little dullness in the bases but it's pretty much equal right and left  Cardiac: Irregularly irregular heart rate in the 80s no murmur  Abdomen: Obese soft no palpable organomegaly or masses  : Not examined  Musc/Skel: Grossly normal  Skin: No jaundice, no petechiae  Neuro:  Alert cooperative grossly intact  Extremities/P Vascular: No clubbing no cyanosis no edema he has palpable radial and dorsalis pedis pulses bilaterally  MSE: Seems to be in good spirits       Labs:    Results from last 7 days  Lab Units 03/15/18  0439 03/14/18  0413 03/13/18  2014 03/13/18  0434 03/12/18  1804   GLUCOSE mg/dL 103* 105* 118* 120* 139*   SODIUM mmol/L 143 143 144 142 144   POTASSIUM mmol/L 4.6 4.3 4.4 3.7 3.9   MAGNESIUM mg/dL 1.7 1.5*  --   --   --    CO2 mmol/L 42.3* 34.3* 32.2* 29.4* 33.5*   CHLORIDE mmol/L 93* 98 102 100 103   ANION GAP mmol/L 7.7 10.7 9.8 12.6 7.5   CREATININE mg/dL 0.94 0.89 0.96 0.80 0.86   BUN mg/dL 24* 18 20 18 18   BUN / CREAT RATIO  25.5* 20.2 20.8 22.5 20.9   CALCIUM mg/dL 8.6 8.5* 8.2* 8.6 8.3*   EGFR IF NONAFRICN AM mL/min/1.73 79 84 77 95 88   ALK PHOS U/L  --   --   --   --  60   TOTAL PROTEIN g/dL  --   --   --   --  6.2   ALT (SGPT) U/L  --   --   --   --  12   AST (SGOT) U/L  --   --   --   --  19   BILIRUBIN mg/dL  --   --   --   --  1.0   ALBUMIN g/dL  --   --   --   --  3.30*   GLOBULIN gm/dL  --   --   --   --  2.9   A/G RATIO g/dL  --   --   --   --  1.1     Estimated Creatinine Clearance: 84.2 mL/min (by C-G formula based on SCr of 0.94 mg/dL).        Results from last 7 days  Lab Units 03/14/18 0413 03/12/18  1804   WBC 10*3/mm3 8.48 7.77   RBC 10*6/mm3 4.57* 4.36*   HEMOGLOBIN g/dL 15.4 14.6   HEMATOCRIT % 48.8 46.0   MCV fL 106.8* 105.5*   MCH pg 33.7* 33.5*   MCHC g/dL 31.6* 31.7*   RDW % 15.6* 15.9*   RDW-SD fl 61.1* 60.9*   MPV fL 11.5 11.5   PLATELETS 10*3/mm3 172 185   NEUTROPHIL % %  --  63.1   LYMPHOCYTE % %  --  25.1   MONOCYTES % %  --  8.8   EOSINOPHIL % %  --  2.7   BASOPHIL % %  --  0.3   NEUTROS ABS 10*3/mm3  --  4.91   LYMPHS ABS 10*3/mm3  --  1.95   MONOS ABS 10*3/mm3  --  0.68   EOS ABS 10*3/mm3  --  0.21   BASOS ABS 10*3/mm3  --  0.02               Results from last 7 days  Lab Units 03/12/18  1804   PROBNP pg/mL 9,589.0*                Results from last 7 days  Lab Units 03/15/18  0439 03/14/18  0413 03/13/18 2014   INR  1.03 1.24* 1.21*     Microbiology Results (last 10 days)     ** No results found for the last 240 hours. **                allopurinol 300 mg Oral Daily   bumetanide 3 mg Intravenous Q12H   enoxaparin 1 mg/kg Subcutaneous Q12H   folic acid 1 mg Oral Daily   losartan 50 mg Oral Q24H   metoprolol tartrate 100 mg Oral Q8H   PARoxetine 20 mg Oral Daily   potassium chloride 40 mEq Oral Q12H   spironolactone 25 mg Oral Daily   warfarin 5 mg Oral Daily       Pharmacy to Dose enoxaparin (LOVENOX)        Diagnostics:  Xr Chest Pa & Lateral    Result Date: 3/12/2018  AP AND LATERAL CHEST  HISTORY: Atrial fibrillation. Difficulty breathing.  COMPARISON: Portable chest 11/26/2006.  FINDINGS: Heart size is enlarged. There are small bilateral pleural effusions with adjacent basilar atelectasis. There is no evidence for pulmonary edema or pneumothorax or other focal airspace disease. Endplate spurring is present in the thoracic spine. Cholecystectomy clips are present.      Cardiomegaly. Small bilateral pleural effusions with basilar atelectasis.  This report was finalized on 3/12/2018 7:27 PM by Dr. Favio Ayers MD.      Results for orders placed during the hospital encounter of 02/08/18   Adult Transthoracic Echo Complete W/ Cont if Necessary Per Protocol    Narrative · Calculated right ventricular systolic pressure from tricuspid   regurgitation is 19 mmHg.  · Left ventricular wall thickness is consistent with moderate concentric   hypertrophy.  · The following left ventricular wall segments are hypokinetic: mid   anterior, apical anterior, mid anterolateral, mid anteroseptal and basal   anterior.  · Left atrial cavity size is moderately dilated.  · Mildly reduced right ventricular systolic function noted.  · Left ventricular systolic function is mildly decreased. Estimated EF =   41%.  · Left ventricular diastolic dysfunction (grade  III) consistent with   reversible restrictive pattern.          3/12/18 chest x-ray reviewed cardiomegaly small bilateral pleural effusions mild thoracic kyphosis    Active Hospital Problems (** Indicates Principal Problem)    Diagnosis Date Noted   • Paroxysmal atrial fibrillation [I48.0] 03/13/2018   • Atrial flutter [I48.92] 03/12/2018      Resolved Hospital Problems    Diagnosis Date Noted Date Resolved   No resolved problems to display.         Assessment/Plan     1. Atrial flutter/paroxysmal atrial fibrillation per cardiology  2. Acute CHF  probably secondary to tachycardia.  No cardiomyopathy with LVEF 40%  3. Possible obstructive sleep apnea patient to have outpatient polysomnogram WITH Dr. Ireland.  4. Nocturnal hypoxemia supplemental O2 until he can complete his sleep study 2 L/m.  This is been ordered for discharge.  5. Obstructive lung disease probable COPD difficult to quantitate the extent of the disease in the presence of pleural effusion/CHF.  And I'm not sure how much of his dyspnea is due to his lung disease versus heart disease.  We will try bronchodilator therapy.  Orders for discharge are in the computer  6. Restrictive pulmonary function testing suspect this is not due to intrinsic lung disease I think that part of it is due to his CHF and pleural effusions he does have some kyphosis and obesity and also likely contributed.  As with obstruction once he is out of failure his pleural effusions have cleared maybe in a month or so repeat lung function testing would be indicated  7. Diabetes mellitus type 2  8. Hypertension  9. Hyperlipidemia  10. Gross hematuria  11. Obesity    Plan for disposition: Okay pulmonary standpoint for discharge home at any time.  Follow-up with sleep study and follow-up with Dr. Ireland after sleep study completed    Stas Connors MD  03/15/18  7:08 AM    Time: I spent over 40 minutes today in patient's care reviewing his studies and history

## 2018-03-15 NOTE — PROGRESS NOTES
s -- feeling less dyspnea  o --bp 100 hr 80s   No jvd  Mostly cta   irreg   No edema     Hr better   Inr still low -- giving warfarin and enoxaparin   Chloride and bun up    Imp -     AFlutter -   We have achieved better hr control  Titrating Warfarin     Back off on diuresis     Appreciate sleep medicine help --     Dc when INR ok     Check labs in am     See orders for changes

## 2018-03-15 NOTE — PROGRESS NOTES
Discharge Planning Assessment  [facility]     Patient Name: Steve Montana  MRN: [id number]  Today's Date: 3/15/2018    Admit Date: 3/12/2018          Discharge Needs Assessment     Row Name 03/15/18 1408       Living Environment    Lives With alone    Current Living Arrangements home/apartment/condo    Potentially Unsafe Housing Conditions unable to assess    Primary Care Provided by self    Quality of Family Relationships supportive    Able to Return to Prior Arrangements yes       Resource/Environmental Concerns    Resource/Environmental Concerns none    Transportation Concerns car, none       Transition Planning    Patient/Family Anticipated Services at Transition none       Discharge Needs Assessment    Readmission Within the Last 30 Days no previous admission in last 30 days    Concerns to be Addressed no discharge needs identified;denies needs/concerns at this time    Equipment Currently Used at Home walker, standard    Equipment Needed After Discharge oxygen            Discharge Plan     Row Name 03/15/18 1410       Plan    Plan Home with 02 through [facility]     Patient/Family in Agreement with Plan yes    Plan Comments CCP met with patient at bedside. CCP role explained, discharge planning discussed and face sheet verified. IMM noted. Patient lives alone, with 2-3 steps to the entrance. Patient uses a walker for mobility. Patient denies any home health or SNF history. Patient uses [facility] on [address]. Patient's PCP is Dr. Craven. Patient consented to using [facility] for home 02. Patient stated he can afford $150 a month for 02 until his sleep study; May 15th. CCP will follow for discharge home with home 02. Bia Fernandez  W           Destination     No service coordination in this encounter.      Durable Medical Equipment     No service coordination in this encounter.      Dialysis/Infusion     No service coordination in this encounter.      Home Medical Care     No service coordination in this  encounter.      Social Care     No service coordination in this encounter.                Demographic Summary     Row Name 03/15/18 1409       General Information    Admission Type inpatient    Arrived From home    Required Notices Provided Important Message from Medicare    Referral Source admission list    Reason for Consult discharge planning    Preferred Language English            Functional Status     Row Name 03/15/18 1408       Functional Status    Usual Activity Tolerance good    Current Activity Tolerance good       Functional Status, IADL    Medications assistive equipment    Meal Preparation assistive equipment    Housekeeping assistive equipment    Laundry assistive equipment    Shopping assistive equipment       Mental Status Summary    Recent Changes in Mental Status/Cognitive Functioning no changes            Psychosocial    No documentation.           Abuse/Neglect    No documentation.           Legal    No documentation.           Substance Abuse    No documentation.           Patient Forms    No documentation.         ANEL Tyson

## 2018-03-16 NOTE — PROGRESS NOTES
Electrophysiology Follow-Up Note      Patient Name: Steve Montana  Age/Sex: 72 y.o. male  : 1945  MRN: [id number]      Day of Service: 18       Chief Complaint/Follow-up: Shortness of breath, Afib    S: Feeling better, breathing better      Temp:  [97.8 °F (36.6 °C)-98 °F (36.7 °C)] 98 °F (36.7 °C)  Heart Rate:  [69-89] 79  Resp:  [17-18] 18  BP: ()/() 119/100     PHYSICAL EXAM:    General Appearance: No acute distress..   Eyes: Conjunctiva and lids: No erythema, swelling, or discharge. Sclera non-icteric.   HENT: Atraumatic, normocephalic. External eyes, ears, and nose normal.   Respiratory: No signs of respiratory distress. Respiration rhythm and depth normal.   Clear to auscultation. No rales, crackles, rhonchi, or wheezing auscultated.   Cardiovascular:  Jugular Venous Pressure: Normal  Heart Rate and Rhythm: Irregularly, irregular.  Heart Sounds: Normal S1 and S2. No S3 or S4 noted.  Murmurs: No murmurs noted. No rubs, thrills, or gallops.   Arterial Pulses: Posterior tibialis and dorsalis pedis pulses normal.   Lower Extremities: No edema noted.  Gastrointestinal:  Abdomen soft, non-distended, non-tender.  Musculoskeletal: Normal movement of extremities  Skin: Warm and dry.   Psychiatric: Patient alert and oriented to person, place, and time. Speech and behavior appropriate. Normal mood and affect.       ECG/TELE: Afib, rate controlled        Results from last 7 days  Lab Units 18  0411 03/15/18  0439 18  0413   SODIUM mmol/L 139 143 143   POTASSIUM mmol/L 4.3 4.6 4.3   CHLORIDE mmol/L 89* 93* 98   CO2 mmol/L 38.8* 42.3* 34.3*   BUN mg/dL 30* 24* 18   CREATININE mg/dL 1.01 0.94 0.89   GLUCOSE mg/dL 121* 103* 105*   CALCIUM mg/dL 8.1* 8.6 8.5*       Results from last 7 days  Lab Units 18  0413 18  1804   WBC 10*3/mm3 8.48 7.77   HEMOGLOBIN g/dL 15.4 14.6   HEMATOCRIT % 48.8 46.0   PLATELETS 10*3/mm3 172 185       Results from last 7 days  Lab Units  03/16/18  0411 03/15/18  0439 03/14/18  0413   INR  1.31* 1.03 1.24*                 Current Medications:   Scheduled Meds:  allopurinol 300 mg Oral Daily   enoxaparin 1 mg/kg Subcutaneous Q12H   folic acid 1 mg Oral Daily   furosemide 80 mg Oral Daily   metoprolol tartrate 100 mg Oral Q8H   PARoxetine 20 mg Oral Daily   spironolactone 50 mg Oral Daily   warfarin 10 mg Oral Daily         Active Problems:    Atrial flutter    Paroxysmal atrial fibrillation       Plan:     -Afib w/RVR>rate improved on current medication regimen  -NICM, EF 40%  -Transitioning anticoagulation, on enoxaparin and warfarin, can be discharged when INR therapeutic >2.0  -Obstructive lung disease, probable COPD, pulmonary following, sleep study as outpatient  -HTN, controlled  -Hematuria, workup as outpt per urology     Doing much better, HR controlled. Discharge when INR therapeutic.     Alexus Oconnor, ENDER  03/16/18  3:47 PM

## 2018-03-16 NOTE — PLAN OF CARE
Problem: Patient Care Overview  Goal: Plan of Care Review   03/16/18 7701   Coping/Psychosocial   Plan of Care Reviewed With patient   Plan of Care Review   Progress improving   OTHER   Outcome Summary patient remains in a controlled aflutter/fib/ patient denies any complaints other than back pain, PRN meds given for this. will need repeat overnight sleep study once closer to DC. per erasmo patient will be d/c''d possibly sunday or monday based on INR. Will continue to monitor.        Problem: Arrhythmia/Dysrhythmia (Symptomatic) (Adult)  Goal: Signs and Symptoms of Listed Potential Problems Will be Absent, Minimized or Managed (Arrhythmia/Dysrhythmia)   03/16/18 2981   Goal/Outcome Evaluation   Problems Assessed (Arrhythmia/Dysrhythmia) all   Problems Present (Dysrhythmia) none

## 2018-03-16 NOTE — PLAN OF CARE
Problem: Patient Care Overview  Goal: Plan of Care Review  Outcome: Ongoing (interventions implemented as appropriate)    Goal: Interprofessional Rounds/Family Conf  Outcome: Ongoing (interventions implemented as appropriate)

## 2018-03-16 NOTE — PROGRESS NOTES
LOS: 4 days   Patient Care Team:  Todd Craven MD as PCP - General (Internal Medicine)  ENDER Patel as PCP - Claims Attributed  Karli Whitt RN as Care Coordinator (Population Health)    Subjective     Patient denies shortness of breath he used 2 L O2 to sleep last night he did not use the Pap machine.  Says he gets good sleep.  Patient says he was up moving around yesterday and didn't have any shortness of breath    Review of Systems:          Objective     Vital Signs  Vital Sign Min/Max for last 24 hours  Temp  Min: 97.8 °F (36.6 °C)  Max: 98 °F (36.7 °C)   BP  Min: 96/80  Max: 114/55   Pulse  Min: 69  Max: 97   Resp  Min: 17  Max: 18   SpO2  Min: 91 %  Max: 97 %   Flow (L/min)  Min: 3  Max: 3   Weight  Min: 101 kg (222 lb 5 oz)  Max: 101 kg (222 lb 5 oz)        Ventilator/Non-Invasive Ventilation Settings     Start     Ordered    03/14/18 1400  . AutoBIPAP; Titrate for SPO2: Per Policy  At Bedtime & As Needed-RT     Question Answer Comment   . AutoBIPAP    Titrate for SPO2 Per Policy        03/13/18 2108                       Body mass index is 33.8 kg/m².  I/O last 3 completed shifts:  In: -   Out: 500 [Urine:500]  No intake/output data recorded.        Physical Exam:  General Appearance: Well-developed obese white male Resting comfortably in bed in no apparent distress  Eyes: Conjunctiva are clear and anicteric  ENT: Oral and nasal mucous membranes are little dry no erythema or exudates he has a Mallampati type III airway  Neck: Fairly large, trachea is midline I don't appreciate jugular venous distention or hepatojugular reflux , no palpable adenopathy or thyromegaly  Lungs: Clear I don't hear any wheezes rales or rhonchi, nonlabored symmetric expansion.  I do not percuss any dullness today  Cardiac: Regular rhythm  Abdomen: Obese soft no palpable organomegaly or masses  : Not examined  Musc/Skel: Grossly normal  Skin: No jaundice, no petechiae  Neuro: Alert cooperative grossly  intact  Extremities/P Vascular: No clubbing no cyanosis no edema he has palpable radial and dorsalis pedis pulses bilaterally  MSE: Seems to be in good spirits       Labs:    Results from last 7 days  Lab Units 03/16/18  0411 03/15/18  0439 03/14/18  0413 03/13/18  2014 03/13/18  0434 03/12/18  1804   GLUCOSE mg/dL 121* 103* 105* 118* 120* 139*   SODIUM mmol/L 139 143 143 144 142 144   POTASSIUM mmol/L 4.3 4.6 4.3 4.4 3.7 3.9   MAGNESIUM mg/dL  --  1.7 1.5*  --   --   --    CO2 mmol/L 38.8* 42.3* 34.3* 32.2* 29.4* 33.5*   CHLORIDE mmol/L 89* 93* 98 102 100 103   ANION GAP mmol/L 11.2 7.7 10.7 9.8 12.6 7.5   CREATININE mg/dL 1.01 0.94 0.89 0.96 0.80 0.86   BUN mg/dL 30* 24* 18 20 18 18   BUN / CREAT RATIO  29.7* 25.5* 20.2 20.8 22.5 20.9   CALCIUM mg/dL 8.1* 8.6 8.5* 8.2* 8.6 8.3*   EGFR IF NONAFRICN AM mL/min/1.73 73 79 84 77 95 88   ALK PHOS U/L  --   --   --   --   --  60   TOTAL PROTEIN g/dL  --   --   --   --   --  6.2   ALT (SGPT) U/L  --   --   --   --   --  12   AST (SGOT) U/L  --   --   --   --   --  19   BILIRUBIN mg/dL  --   --   --   --   --  1.0   ALBUMIN g/dL  --   --   --   --   --  3.30*   GLOBULIN gm/dL  --   --   --   --   --  2.9   A/G RATIO g/dL  --   --   --   --   --  1.1     Estimated Creatinine Clearance: 77.2 mL/min (by C-G formula based on SCr of 1.01 mg/dL).        Results from last 7 days  Lab Units 03/14/18 0413 03/12/18  1804   WBC 10*3/mm3 8.48 7.77   RBC 10*6/mm3 4.57* 4.36*   HEMOGLOBIN g/dL 15.4 14.6   HEMATOCRIT % 48.8 46.0   MCV fL 106.8* 105.5*   MCH pg 33.7* 33.5*   MCHC g/dL 31.6* 31.7*   RDW % 15.6* 15.9*   RDW-SD fl 61.1* 60.9*   MPV fL 11.5 11.5   PLATELETS 10*3/mm3 172 185   NEUTROPHIL % %  --  63.1   LYMPHOCYTE % %  --  25.1   MONOCYTES % %  --  8.8   EOSINOPHIL % %  --  2.7   BASOPHIL % %  --  0.3   NEUTROS ABS 10*3/mm3  --  4.91   LYMPHS ABS 10*3/mm3  --  1.95   MONOS ABS 10*3/mm3  --  0.68   EOS ABS 10*3/mm3  --  0.21   BASOS ABS 10*3/mm3  --  0.02               Results  from last 7 days  Lab Units 03/12/18  1804   PROBNP pg/mL 9,589.0*               Results from last 7 days  Lab Units 03/16/18  0411 03/15/18  0439 03/14/18  0413 03/13/18 2014   INR  1.31* 1.03 1.24* 1.21*     Microbiology Results (last 10 days)     ** No results found for the last 240 hours. **                allopurinol 300 mg Oral Daily   enoxaparin 1 mg/kg Subcutaneous Q12H   folic acid 1 mg Oral Daily   furosemide 80 mg Oral Daily   metoprolol tartrate 100 mg Oral Q8H   PARoxetine 20 mg Oral Daily   spironolactone 50 mg Oral Daily   warfarin 10 mg Oral Daily       Pharmacy to Dose enoxaparin (LOVENOX)        Diagnostics:  Xr Chest Pa & Lateral    Result Date: 3/12/2018  AP AND LATERAL CHEST  HISTORY: Atrial fibrillation. Difficulty breathing.  COMPARISON: Portable chest 11/26/2006.  FINDINGS: Heart size is enlarged. There are small bilateral pleural effusions with adjacent basilar atelectasis. There is no evidence for pulmonary edema or pneumothorax or other focal airspace disease. Endplate spurring is present in the thoracic spine. Cholecystectomy clips are present.      Cardiomegaly. Small bilateral pleural effusions with basilar atelectasis.  This report was finalized on 3/12/2018 7:27 PM by Dr. Favio Ayers MD.      Results for orders placed during the hospital encounter of 02/08/18   Adult Transthoracic Echo Complete W/ Cont if Necessary Per Protocol    Narrative · Calculated right ventricular systolic pressure from tricuspid   regurgitation is 19 mmHg.  · Left ventricular wall thickness is consistent with moderate concentric   hypertrophy.  · The following left ventricular wall segments are hypokinetic: mid   anterior, apical anterior, mid anterolateral, mid anteroseptal and basal   anterior.  · Left atrial cavity size is moderately dilated.  · Mildly reduced right ventricular systolic function noted.  · Left ventricular systolic function is mildly decreased. Estimated EF =   41%.  · Left ventricular  diastolic dysfunction (grade III) consistent with   reversible restrictive pattern.          3/12/18 chest x-ray reviewed cardiomegaly small bilateral pleural effusions mild thoracic kyphosis    Active Hospital Problems (** Indicates Principal Problem)    Diagnosis Date Noted   • Paroxysmal atrial fibrillation [I48.0] 03/13/2018   • Atrial flutter [I48.92] 03/12/2018      Resolved Hospital Problems    Diagnosis Date Noted Date Resolved   No resolved problems to display.         Assessment/Plan     1. Atrial flutter/paroxysmal atrial fibrillation per cardiology  2. Acute CHF  probably secondary to tachycardia.  Nonischemic cardiomyopathy with LVEF 40%  3. Possible obstructive sleep apnea patient to have outpatient polysomnogram WITH Dr. Ireland.  4. Nocturnal hypoxemia supplemental O2 until he can complete his sleep study 2 L/m.  This is been ordered for discharge.  5. Obstructive lung disease probable COPD difficult to quantitate the extent of the disease in the presence of pleural effusion/CHF.  And I'm not sure how much of his dyspnea is due to his lung disease versus heart disease.  We will try bronchodilator therapy.  Orders for discharge are in the computer  6. Restrictive pulmonary function testing suspect this is not due to intrinsic lung disease . I think that part of it is due to his CHF and pleural effusions he does have some kyphosis and obesity and these also likely contribute.   once he is out of failure his pleural effusions have cleared maybe in a month or so repeat lung function testing would be indicated  7. Diabetes mellitus type 2  8. Hypertension  9. Hyperlipidemia  10. Gross hematuria  11. Obesity    Plan for disposition: Okay pulmonary standpoint for discharge home at any time.  Follow-up with sleep study and follow-up with Dr. Ireland after sleep study completed    Stas Connors MD  03/16/18  7:57 AM    Time: I spent over 40 minutes today in patient's care reviewing his studies and history

## 2018-03-17 NOTE — PROGRESS NOTES
LOS: 5 days   Patient Care Team:  Todd Craven MD as PCP - General (Internal Medicine)  ENDER Patel as PCP - Claims Attributed  Karli Whitt RN as Care Coordinator (Population Health)    Subjective     Patient denies shortness of breath on 2 L O2 and 40 when he hasn't taken off day or night.  He has declined the use of PAP machine here  Review of Systems:          Objective     Vital Signs  Vital Sign Min/Max for last 24 hours  Temp  Min: 97.4 °F (36.3 °C)  Max: 98 °F (36.7 °C)   BP  Min: 116/85  Max: 130/96   Pulse  Min: 54  Max: 84   Resp  Min: 18  Max: 18   SpO2  Min: 94 %  Max: 94 %   Flow (L/min)  Min: 2  Max: 3   Weight  Min: 100 kg (221 lb 4 oz)  Max: 100 kg (221 lb 4 oz)        Ventilator/Non-Invasive Ventilation Settings     Start     Ordered    03/14/18 1400  . AutoBIPAP; Titrate for SPO2: Per Policy  At Bedtime & As Needed-RT     Question Answer Comment   . AutoBIPAP    Titrate for SPO2 Per Policy        03/13/18 2108                       Body mass index is 33.64 kg/m².  I/O last 3 completed shifts:  In: 360 [P.O.:360]  Out: -   No intake/output data recorded.        Physical Exam:  General Appearance: Well-developed obese white male Resting comfortably in bed in no apparent distress  Eyes: Conjunctiva are clear and anicteric  ENT: Oral and nasal mucous membranes are little dry no erythema or exudates he has a Mallampati type III airway  Neck: Fairly large, trachea is midline I don't appreciate jugular venous distention or hepatojugular reflux , no palpable adenopathy or thyromegaly  Lungs: Clear I don't hear any wheezes rales or rhonchi, nonlabored symmetric expansion.AP some minimal dullness in the right base on percussion today  Cardiac: Regular rhythm  Abdomen: Obese soft no palpable organomegaly or masses  : Not examined  Musc/Skel: Grossly normal  Skin: No jaundice, no petechiae  Neuro: Alert cooperative grossly intact  Extremities/P Vascular: No clubbing no cyanosis  no edema he has palpable radial and dorsalis pedis pulses bilaterally  MSE: Seems to be in good spirits       Labs:    Results from last 7 days  Lab Units 03/17/18  0414 03/16/18  0411 03/15/18  0439 03/14/18  0413 03/13/18  2014 03/13/18  0434 03/12/18  1804   GLUCOSE mg/dL 98 121* 103* 105* 118* 120* 139*   SODIUM mmol/L 140 139 143 143 144 142 144   POTASSIUM mmol/L 5.0 4.3 4.6 4.3 4.4 3.7 3.9   MAGNESIUM mg/dL  --   --  1.7 1.5*  --   --   --    CO2 mmol/L 39.5* 38.8* 42.3* 34.3* 32.2* 29.4* 33.5*   CHLORIDE mmol/L 90* 89* 93* 98 102 100 103   ANION GAP mmol/L 10.5 11.2 7.7 10.7 9.8 12.6 7.5   CREATININE mg/dL 0.88 1.01 0.94 0.89 0.96 0.80 0.86   BUN mg/dL 31* 30* 24* 18 20 18 18   BUN / CREAT RATIO  35.2* 29.7* 25.5* 20.2 20.8 22.5 20.9   CALCIUM mg/dL 8.5* 8.1* 8.6 8.5* 8.2* 8.6 8.3*   EGFR IF NONAFRICN AM mL/min/1.73 85 73 79 84 77 95 88   ALK PHOS U/L  --   --   --   --   --   --  60   TOTAL PROTEIN g/dL  --   --   --   --   --   --  6.2   ALT (SGPT) U/L  --   --   --   --   --   --  12   AST (SGOT) U/L  --   --   --   --   --   --  19   BILIRUBIN mg/dL  --   --   --   --   --   --  1.0   ALBUMIN g/dL  --   --   --   --   --   --  3.30*   GLOBULIN gm/dL  --   --   --   --   --   --  2.9   A/G RATIO g/dL  --   --   --   --   --   --  1.1     Estimated Creatinine Clearance: 88.2 mL/min (by C-G formula based on SCr of 0.88 mg/dL).        Results from last 7 days  Lab Units 03/17/18  0414 03/14/18  0413 03/12/18  1804   WBC 10*3/mm3  --  8.48 7.77   RBC 10*6/mm3  --  4.57* 4.36*   HEMOGLOBIN g/dL 15.7 15.4 14.6   HEMATOCRIT % 48.8 48.8 46.0   MCV fL  --  106.8* 105.5*   MCH pg  --  33.7* 33.5*   MCHC g/dL  --  31.6* 31.7*   RDW %  --  15.6* 15.9*   RDW-SD fl  --  61.1* 60.9*   MPV fL  --  11.5 11.5   PLATELETS 10*3/mm3 186 172 185   NEUTROPHIL % %  --   --  63.1   LYMPHOCYTE % %  --   --  25.1   MONOCYTES % %  --   --  8.8   EOSINOPHIL % %  --   --  2.7   BASOPHIL % %  --   --  0.3   NEUTROS ABS 10*3/mm3  --    --  4.91   LYMPHS ABS 10*3/mm3  --   --  1.95   MONOS ABS 10*3/mm3  --   --  0.68   EOS ABS 10*3/mm3  --   --  0.21   BASOS ABS 10*3/mm3  --   --  0.02               Results from last 7 days  Lab Units 03/12/18  1804   PROBNP pg/mL 9,589.0*               Results from last 7 days  Lab Units 03/17/18  0414 03/16/18  0411 03/15/18  0439 03/14/18  0413 03/13/18 2014   INR  1.94* 1.31* 1.03 1.24* 1.21*     Microbiology Results (last 10 days)     ** No results found for the last 240 hours. **                allopurinol 300 mg Oral Daily   enoxaparin 1 mg/kg Subcutaneous Q12H   folic acid 1 mg Oral Daily   furosemide 80 mg Oral Daily   metoprolol tartrate 100 mg Oral Q8H   PARoxetine 20 mg Oral Daily   spironolactone 50 mg Oral Daily   warfarin 10 mg Oral Daily       Pharmacy to Dose enoxaparin (LOVENOX)        Diagnostics:  Xr Chest Pa & Lateral    Result Date: 3/12/2018  AP AND LATERAL CHEST  HISTORY: Atrial fibrillation. Difficulty breathing.  COMPARISON: Portable chest 11/26/2006.  FINDINGS: Heart size is enlarged. There are small bilateral pleural effusions with adjacent basilar atelectasis. There is no evidence for pulmonary edema or pneumothorax or other focal airspace disease. Endplate spurring is present in the thoracic spine. Cholecystectomy clips are present.      Cardiomegaly. Small bilateral pleural effusions with basilar atelectasis.  This report was finalized on 3/12/2018 7:27 PM by Dr. Favio Ayers MD.      Results for orders placed during the hospital encounter of 02/08/18   Adult Transthoracic Echo Complete W/ Cont if Necessary Per Protocol    Narrative · Calculated right ventricular systolic pressure from tricuspid   regurgitation is 19 mmHg.  · Left ventricular wall thickness is consistent with moderate concentric   hypertrophy.  · The following left ventricular wall segments are hypokinetic: mid   anterior, apical anterior, mid anterolateral, mid anteroseptal and basal   anterior.  · Left atrial  cavity size is moderately dilated.  · Mildly reduced right ventricular systolic function noted.  · Left ventricular systolic function is mildly decreased. Estimated EF =   41%.  · Left ventricular diastolic dysfunction (grade III) consistent with   reversible restrictive pattern.          3/12/18 chest x-ray reviewed cardiomegaly small bilateral pleural effusions mild thoracic kyphosis    Active Hospital Problems (** Indicates Principal Problem)    Diagnosis Date Noted   • Paroxysmal atrial fibrillation [I48.0] 03/13/2018   • Atrial flutter [I48.92] 03/12/2018      Resolved Hospital Problems    Diagnosis Date Noted Date Resolved   No resolved problems to display.         Assessment/Plan     1. Atrial flutter/paroxysmal atrial fibrillation per cardiology  2. Acute CHF  probably secondary to tachycardia.  Nonischemic cardiomyopathy with LVEF 40%  3. Possible obstructive sleep apnea patient to have outpatient polysomnogram WITH Dr. Ireland.  4. Nocturnal hypoxemia supplemental O2 until he can complete his sleep study 2 L/m.  This is been ordered for discharge. I will have to recertify needs looks like DC planned next 48 hours will recheck study tonight  5. Obstructive lung disease probable COPD difficult to quantitate the extent of the disease in the presence of pleural effusion/CHF.  And I'm not sure how much of his dyspnea is due to his lung disease versus heart disease.  We will try bronchodilator therapy.  Orders for discharge are in the computer  6. Restrictive pulmonary function testing suspect this is not due to intrinsic lung disease . I think that part of it is due to his CHF and pleural effusions he does have some kyphosis and obesity and these also likely contribute.   once he is out of failure his pleural effusions have cleared maybe in a month or so repeat lung function testing would be indicated  7. Diabetes mellitus type 2  8. Hypertension  9. Hyperlipidemia  10. Gross hematuria  11. Obesity    Plan for  disposition: Okay pulmonary standpoint for discharge home at any time.  Follow-up with sleep study and follow-up with Dr. Ireland after sleep study completed    Stas Connors MD  03/17/18  9:00 AM    Time:

## 2018-03-17 NOTE — PROGRESS NOTES
Exercise Oximetry    Patient Name:Steve Montana   MRN: [id number]   Date: 03/17/18             ROOM AIR BASELINE   SpO2% 92   Heart Rate 75   Blood Pressure      EXERCISE ON ROOM AIR SpO2% EXERCISE ON O2 @  LPM SpO2%   1 MINUTE 95 1 MINUTE    2 MINUTES 97 2 MINUTES    3 MINUTES 94 3 MINUTES    4 MINUTES 93 4 MINUTES    5 MINUTES 95 5 MINUTES    6 MINUTES 95 6 MINUTES               Distance Walked   Distance Walked   Dyspnea (Emi Scale)   Dyspnea (Emi Scale)   Fatigue (Emi Scale)   Fatigue (Emi Scale)   SpO2% Post Exercise  95 SpO2% Post Exercise   HR Post Exercise  78 HR Post Exercise   Time to Recovery   Time to Recovery     Comments: Room Air

## 2018-03-17 NOTE — PROGRESS NOTES
"  Cardiology Progress Note    Patient Identification:  Name: Steve Montana  Age: 72 y.o.  Sex: male  :  1945  MRN: [id number]                 Follow Up / Chief Complaint: Atrial flutter, paroxysmal atrial fibrillation,     Subjective:  Denies chest pain, tightness, palpitations or dizziness.  \"Didn't sleep well because of CPAP\" last evening.  Denies cough.  Denies abdominal pain    Objective:  vr 54-70s, no pauses  BP 120s-130s/80s-90  Afebrile    K 5.0  INR 1.94  sats 88-89% on room air at rest    Past Medical History:  Past Medical History:   Diagnosis Date   • Abdominal cramping, periumbilical    • Bladder cancer    • Coronary artery disease     Stent placement   • Depression    • Diabetes mellitus type II, controlled, with no complications    • Essential hypertension, benign    • Gout    • Hyperlipidemia    • Injury of back    • Meningioma    • Neoplasm of uncertain behavior of skin    • Osteoarthritis, multiple sites    • Pancreatitis    • Shortness of breath    • Sinusitis    • Substance abuse    • Tobacco use disorder      Past Surgical History:  Past Surgical History:   Procedure Laterality Date   • BRAIN TUMOR EXCISION Left 2015   • CHOLECYSTECTOMY     • CORONARY STENT PLACEMENT     • KNEE ARTHROSCOPY Left    • SHOULDER ROTATOR CUFF REPAIR Left    • TOTAL HIP ARTHROPLASTY Left 2009        Social History:   Social History   Substance Use Topics   • Smoking status: Current Every Day Smoker     Years: 0.00     Types: Cigars   • Smokeless tobacco: Never Used      Comment: 2 daily    • Alcohol use 8.4 oz/week     14 Shots of liquor per week      Comment: Caffeine: coffee- rare; Soda- Sprite 3- 12 oz cans daily      Family History:  Family History   Problem Relation Age of Onset   • Depression Mother       at 80   • Pancreatic cancer Father       at 67   • Hypertension Father 40   • Aneurysm Brother      thoracic:   at 59          Allergies:  No Known Allergies  Scheduled " Meds:    allopurinol 300 mg Daily   enoxaparin 1 mg/kg Q12H   folic acid 1 mg Daily   furosemide 80 mg Daily   metoprolol tartrate 100 mg Q8H   PARoxetine 20 mg Daily   spironolactone 50 mg Daily   warfarin 10 mg Daily           INTAKE AND OUTPUT:    Intake/Output Summary (Last 24 hours) at 03/17/18 0832  Last data filed at 03/16/18 1800   Gross per 24 hour   Intake              360 ml   Output                0 ml   Net              360 ml       Review of Systems:   GI: No nausea or abdominal pain  Cardiac: No chest pain or dizziness  Pulmonary:sats 88-89% on room air at rest    Constitutional:  Temp:  [97.4 °F (36.3 °C)-98 °F (36.7 °C)] 97.4 °F (36.3 °C)  Heart Rate:  [54-84] 54  Resp:  [18] 18  BP: (116-126)/() 116/85    Physical Exam:  General:  Appears in no acute distress  Eyes: PERTL,  HEENT:  No JVD. No mucosal pallor or cyanosis on supplemental O2  Respiratory: Respirations regular and unlabored at rest. BBS with good air entry in all fields. No crackles  or wheezes auscultated  Cardiovascular: S1S2  irregular rate and rhythm. No murmur PT pulses 1-2   Trace distal pretibial pitting edema  Gastrointestinal: Abdomen soft,  non tender. Bowel sounds present.No ascites  Musculoskeletal: MARTI x4. No abnormal movements  Extremities: No digital clubbing or cyanosis  Skin: Color pink. Skin warm and dry to touch on torso, hands and feet slightly cool.. No rashes    Neuro: AAO x3 CN II-XII grossly intact  Psych: Mood and affect normal, pleasant and cooperative      Cardiographics  Telemetry:           Lab Review         Results from last 7 days  Lab Units 03/15/18  0439   MAGNESIUM mg/dL 1.7       Results from last 7 days  Lab Units 03/17/18  0414   SODIUM mmol/L 140   POTASSIUM mmol/L 5.0   BUN mg/dL 31*   CREATININE mg/dL 0.88   CALCIUM mg/dL 8.5*     @[id number]@    Results from last 7 days  Lab Units 03/17/18  0414 03/14/18  0413 03/12/18  1804   WBC 10*3/mm3  --  8.48 7.77   HEMOGLOBIN g/dL 15.7 15.4 14.6  "  HEMATOCRIT % 48.8 48.8 46.0   PLATELETS 10*3/mm3 186 172 185       Results from last 7 days  Lab Units 03/17/18  0414 03/16/18  0411 03/15/18  0439   INR  1.94* 1.31* 1.03         Assessment:  -Afib w/RVR  -NICM, EF 40%  -Obstructive lung disease, probable COPD, pulmonary following, sleep study as outpatient  -HTN  -Hematuria       Plan:  -Afib w/RVR - vr 54-70's. No pauses >2.0 sec    -NICM, EF 40% - clinically compensated.  Renal function  stable on oral Lasix and Aldactone. Monitor potassium    -Transitioning anticoagulation, on enoxaparin and warfarin, can be discharged when INR therapeutic >2.0    -HTN -  controlled on Lopressor, Aldactone     Pulmonology to evaluate patient for need home O2 and arrange CPAP.  Dietitian to speak with patient regarding Coumadin diet.  Recheck INR, BMP in a.m.    Labs/tests ordered for am: INR, EKG, BMP      )3/17/2018  ENDER Lee/Transcription:   \"Dictated utilizing Dragon dictation\".     "

## 2018-03-17 NOTE — PLAN OF CARE
Problem: Patient Care Overview  Goal: Plan of Care Review  Outcome: Ongoing (interventions implemented as appropriate)      Problem: Fall Risk (Adult)  Goal: Absence of Fall  Outcome: Ongoing (interventions implemented as appropriate)      Problem: Arrhythmia/Dysrhythmia (Symptomatic) (Adult)  Goal: Signs and Symptoms of Listed Potential Problems Will be Absent, Minimized or Managed (Arrhythmia/Dysrhythmia)  Outcome: Ongoing (interventions implemented as appropriate)

## 2018-03-18 NOTE — DISCHARGE SUMMARY
[facility]  Physician Discharge Summary    Patient Identification:  Name: Steve oMntana  Age: 72 y.o.  Sex: male  :  1945  MRN: [id number]    Admit date: 3/12/2018    Discharge date and time:  3-18-18    Admitting Physician: Liz Campos MD     Discharge Physician: Lora HANDLEY    Discharge Diagnoses:   - Afib w/RVR  - Anticoagulation started  - CM, EF 40%, III smith dysfx  - h/o CAD, previous PCI  - Obstructive lung disease, probable COPD,   - BUFFY - discharged with CPAP, sleep study as outpatient  - HTN  - Hematuria -resolved, outpatient follow-up with urology  - DM  - Dyslipidemia-newly diagnosed.  Statin started    Discharged Condition:  Stable     Hospital Course:   Patient admitted or newly diagnosed atrial fibrillation with RVR and subsequent decompensated heart failure.  MI ruled out.  He was placed on IV diuretics and beta blockers for rate control.  Renal function remains stable.  Statin was started for newly diagnosed dyslipidemia.  He will need repeat LFTs and lipid panel and a-12 weeks    A dietitian met with him regarding diet adjustments for diabetes and Coumadin    He was seen by electrophysiology who recommended rate control and anticoagulation.  Adjustments were made to his beta blocker with improved ventricular rates, no pauses.    He was seen by urology for hematuria.  (eventually resolved) with stable H&H however outpatient follow-up for cystoscopy was recommended    He was evaluated by pulmonology for suspected BUFFY.  He was placed on BiPAP and nocturnal oxygen (which is also been prescribed at discharge).  Outpatient follow-up formal PSG testing requested    On day of discharge, patient's alert, ambulating without dizziness, lightheadedness or chest pain.  Ventricular rate is controlled.  Blood pressure stable and  INR therapeutic.  He has clear lung fields and no peripheral edema with stable renal function and potassium levels.  I reviewed with him changes to  his home medications, need for absolute smoking and EtOH avoidance, importance compliance with all medications occluding prescribed oxygen and CPAP, follow-up labs, testing and appointments.  He's been advised return to ER for any recurrent symptoms including, but not limited to: chest pain/pressure/tightness, weakness, shortness of breath, dizziness, palpitations, near syncope or syncope bleeding or hematuria.  All questions were answered.  He voiced understanding.  He was told to call Dr. Campos's office tomorrow to obtain a follow-up appointment, have his INR checked on Tuesday with further Coumadin dosing as per Dr. Campos.  He needs to follow-up with Dr. Ireland for outpatient PSG testing and oxygen management in 2-3 weeks, urology for outpatient cystoscopy and PCP in one week.  He was advised to call Dr. Campos's office in the interim for any questions or concerns.  All questions were answered.  He voiced understanding and agreement with treatment plan    Consults:   IP CONSULT TO UROLOGY  IP CONSULT TO CASE MANAGEMENT   IP CONSULT TO NUTRITION SERVICES  IP CONSULT TO ACCESS CENTER  IP CONSULT TO CARDIAC ELECTROPHYSIOLOGIST  IP CONSULT TO SLEEP MEDICINE  IP CONSULT TO PULMONOLOGY  IP CONSULT TO NUTRITION SERVICES  IP CONSULT TO NUTRITION SERVICES    Discharge Exam:  General:  Appears in no acute distress  Eyes: PERTL,  HEENT:  No JVD.  Oral mucosa moist, no cyanosis   Respiratory: Respirations regular and unlabored at rest. BBS with good air entry in all fields. No crackles  or wheezes auscultated  Cardiovascular: S1S2  irregular rate and rhythm. No murmur No pretibial pitting edema  Gastrointestinal: Abdomen soft,  non tender. Bowel sounds present.No ascites  Musculoskeletal: MARTI x4. No abnormal movements  Extremities: No digital clubbing or cyanosis  Skin: Color pink. Skin warm and dry to touch   Neuro: AAO x3 CN II-XII grossly intact  Psych: Mood and affect normal, pleasant and  cooperative         Tele:      LABS:    Results from last 7 days  Lab Units 03/18/18  0659   SODIUM mmol/L 140   POTASSIUM mmol/L 4.1   BUN mg/dL 26*   CREATININE mg/dL 0.96   CALCIUM mg/dL 8.6       Results from last 7 days  Lab Units 03/17/18  0414 03/14/18 0413 03/12/18  1804   WBC 10*3/mm3  --  8.48 7.77   HEMOGLOBIN g/dL 15.7 15.4 14.6   HEMATOCRIT % 48.8 48.8 46.0   PLATELETS 10*3/mm3 186 172 185       Results from last 7 days  Lab Units 03/18/18  0413 03/17/18  0414 03/16/18  0411   INR  2.63* 1.94* 1.31*         Disposition:  home    Discharge Medications:    Steve Montana   Home Medication Instructions LEÓN:[id number]    Printed on:03/18/18 9836   Medication Information                      albuterol (PROVENTIL HFA;VENTOLIN HFA) 108 (90 Base) MCG/ACT inhaler  Inhale 2 puffs Every 4 (Four) Hours As Needed for Wheezing.             allopurinol (ZYLOPRIM) 300 MG tablet  TAKE ONE TABLET BY MOUTH DAILY             atorvastatin (LIPITOR) 10 MG tablet  Take 1 tablet by mouth Every Night.             cimetidine (TAGAMET) 800 MG tablet  Take 800 mg by mouth Every Night.             cyanocobalamin ([employer] VITAMIN B-12) 1000 MCG tablet  1 tablet daily             folic acid (FOLVITE) 1 MG tablet  Take 1 tablet by mouth Daily.             furosemide (LASIX) 40 MG tablet  Take 1 tablet by mouth Daily.             HYDROcodone-acetaminophen (NORCO) 7.5-325 MG per tablet  Take 1 tablet by mouth Every 6 (Six) Hours As Needed for Severe Pain .             irbesartan (AVAPRO) 300 MG tablet  Take 1 tablet by mouth Daily.             meclizine (ANTIVERT) 25 MG tablet  Take 1 tablet by mouth Every 12 (Twelve) Hours As Needed for dizziness.             metoprolol tartrate (LOPRESSOR) 100 MG tablet  Take 1 tablet by mouth Every 8 (Eight) Hours.             nitroglycerin (NITROSTAT) 0.4 MG SL tablet  Place 1 tablet under the tongue Every 5 (Five) Minutes As Needed for Chest Pain. Take no more than 3 doses in 15 minutes.            "  PARoxetine (PAXIL) 20 MG tablet  TAKE ONE TABLET BY MOUTH DAILY             spironolactone (ALDACTONE) 25 MG tablet  Take 1 tablet by mouth Daily.             umeclidinium-vilanterol (ANORO ELLIPTA) 62.5-25 MCG/INH aerosol powder  inhaler  Inhale 1 puff Daily.             warfarin (COUMADIN) 2.5 MG tablet  And as ordered to maintain target INR 2.0-3.0 range.  Hold for INR >/=2.9             zolpidem (AMBIEN) 5 MG tablet  Bring to sleep lab DO NOT use at home                   Discharge Home Instructions:  1. INR to be drawn on 3- with results called to Dr. Liz Campos's office  2. Return to EMS for any recurrent symptoms including, but not limited to: chest pain/pressure/tightness, weakness, shortness of breath, dizziness, palpitations, near syncope or syncope  3.  Follow up with Dr. Liz Campos in 2 weeks  4.  Follow-up with pulmonology, Dr. Ireland in 3 weeks for outpatient PSG testing, O2 and BUFFY management  5.  Follow-up with Dr. King Carpio-urology for outpatient cystoscopy, history of hematuria  6.  Follow-up with PCP in one week for ongoing medical care including management of dyslipidemia.  Repeat LFTs and lipid panel recommended in 8-12 weeks since statin has been started this admission      Signed:  ENDER Lee  3/18/2018  8:45 AM      EMR Dragon/Transcription:   \"Dictated utilizing Dragon dictation\".     "

## 2018-03-18 NOTE — PROGRESS NOTES
Continued Stay Note  [facility]     Patient Name: Steve Montana  MRN: [id number]  Today's Date: 3/18/2018    Admit Date: 3/12/2018          Discharge Plan     Row Name 03/18/18 0957       Plan    Plan Home with O2 from [facility]...............Naveen ROBERTS     Patient/Family in Agreement with Plan yes    Plan Comments Patient will need O2 for home at . S/W Patient, introduced self and role. Pt. states address on face sheet is incorrect, corrected address and verified all other information as correct. Pt. verified he wishes to have [facility] provide O2. Information faxed to [facility] and patient informed they will deliver to his home this afternoon. Pt. instructed to call [facility] at [phone] option 1 when he arrives home so O2 can be delivered. No further needs voiced. Pt. to DC home..........................Naveen ROBERTS     Final Discharge Disposition Code 01 - home or self-care              Discharge Codes    No documentation.       Expected Discharge Date and Time     Expected Discharge Date Expected Discharge Time    Mar 18, 2018             Juliet Vieyra, ARMANDO     Additional Progress Note...

## 2018-03-18 NOTE — PLAN OF CARE
Problem: Patient Care Overview  Goal: Plan of Care Review  Outcome: Ongoing (interventions implemented as appropriate)   03/18/18 0406   Coping/Psychosocial   Plan of Care Reviewed With patient   Plan of Care Review   Progress improving   OTHER   Outcome Summary Pt remains in controlled afib. SBP 90s-110s. Sleep study completed. Discharge pending INR. Will continue to monitor.      Goal: Individualization and Mutuality  Outcome: Ongoing (interventions implemented as appropriate)   03/12/18 1942   Individualization   Patient Specific Preferences Prefers to be called Mo     Goal: Discharge Needs Assessment  Outcome: Ongoing (interventions implemented as appropriate)   03/18/18 0406   Discharge Needs Assessment   Concerns to be Addressed no discharge needs identified;denies needs/concerns at this time       Problem: Fall Risk (Adult)  Goal: Absence of Fall  Outcome: Ongoing (interventions implemented as appropriate)   03/18/18 0406   Fall Risk (Adult)   Absence of Fall making progress toward outcome       Problem: Arrhythmia/Dysrhythmia (Symptomatic) (Adult)  Goal: Signs and Symptoms of Listed Potential Problems Will be Absent, Minimized or Managed (Arrhythmia/Dysrhythmia)  Outcome: Ongoing (interventions implemented as appropriate)   03/18/18 0406   Goal/Outcome Evaluation   Problems Assessed (Arrhythmia/Dysrhythmia) all   Problems Present (Dysrhythmia) electrophysiologic conduction defect       Problem: Skin Injury Risk (Adult)  Goal: Identify Related Risk Factors and Signs and Symptoms  Outcome: Ongoing (interventions implemented as appropriate)   03/18/18 0406   Skin Injury Risk (Adult)   Related Risk Factors (Skin Injury Risk) advanced age;body weight extremes     Goal: Skin Health and Integrity  Outcome: Ongoing (interventions implemented as appropriate)   03/18/18 0406   Skin Injury Risk (Adult)   Skin Health and Integrity making progress toward outcome

## 2018-03-18 NOTE — PROGRESS NOTES
LOS: 6 days   Patient Care Team:  Todd Craven MD as PCP - General (Internal Medicine)  ENDER Patel as PCP - Claims Attributed  Karli Whitt RN as Care Coordinator (Population Health)    Subjective   Denies SOB or chest pain hoping to go home today  Review of Systems:          Objective     Vital Signs  Vital Sign Min/Max for last 24 hours  Temp  Min: 97.6 °F (36.4 °C)  Max: 98 °F (36.7 °C)   BP  Min: 82/51  Max: 133/91   Pulse  Min: 81  Max: 97   Resp  Min: 18  Max: 20   SpO2  Min: 94 %  Max: 95 %   Flow (L/min)  Min: 2  Max: 2   Weight  Min: 96.7 kg (213 lb 4 oz)  Max: 96.7 kg (213 lb 4 oz)        Ventilator/Non-Invasive Ventilation Settings     Start     Ordered    03/14/18 1400  . AutoBIPAP; Titrate for SPO2: Per Policy  At Bedtime & As Needed-RT     Question Answer Comment   . AutoBIPAP    Titrate for SPO2 Per Policy        03/13/18 2108                       Body mass index is 32.42 kg/m².  I/O last 3 completed shifts:  In: 720 [P.O.:720]  Out: -   No intake/output data recorded.        Physical Exam:  General Appearance: Well-developed obese white male Resting comfortably in bed in no apparent distress  Eyes: Conjunctiva are clear and anicteric  ENT: Oral and nasal mucous membranes are little dry no erythema or exudates he has a Mallampati type III airway  Neck: Fairly large, trachea is midline I don't appreciate jugular venous distention or hepatojugular reflux , no palpable adenopathy or thyromegaly  Lungs: Clear I don't hear any wheezes rales or rhonchi, nonlabored symmetric expansion.no dullness on percussion  Cardiac: Regular rhythm  Abdomen: Obese soft no palpable organomegaly or masses  : Not examined  Musc/Skel: Grossly normal  Skin: No jaundice, no petechiae  Neuro: Alert cooperative grossly intact  Extremities/P Vascular: No clubbing no cyanosis no edema he has palpable radial and dorsalis pedis pulses bilaterally  MSE: Seems to be in good  [facility]       Labs:    Results from last 7 days  Lab Units 03/18/18  0659 03/17/18  0414 03/16/18  0411 03/15/18  0439 03/14/18  0413 03/13/18  2014 03/13/18  0434 03/12/18  1804   GLUCOSE mg/dL 126* 98 121* 103* 105* 118* 120* 139*   SODIUM mmol/L 140 140 139 143 143 144 142 144   POTASSIUM mmol/L 4.1 5.0 4.3 4.6 4.3 4.4 3.7 3.9   MAGNESIUM mg/dL  --   --   --  1.7 1.5*  --   --   --    CO2 mmol/L 35.9* 39.5* 38.8* 42.3* 34.3* 32.2* 29.4* 33.5*   CHLORIDE mmol/L 94* 90* 89* 93* 98 102 100 103   ANION GAP mmol/L 10.1 10.5 11.2 7.7 10.7 9.8 12.6 7.5   CREATININE mg/dL 0.96 0.88 1.01 0.94 0.89 0.96 0.80 0.86   BUN mg/dL 26* 31* 30* 24* 18 20 18 18   BUN / CREAT RATIO  27.1* 35.2* 29.7* 25.5* 20.2 20.8 22.5 20.9   CALCIUM mg/dL 8.6 8.5* 8.1* 8.6 8.5* 8.2* 8.6 8.3*   EGFR IF NONAFRICN AM mL/min/1.73 77 85 73 79 84 77 95 88   ALK PHOS U/L  --   --   --   --   --   --   --  60   TOTAL PROTEIN g/dL  --   --   --   --   --   --   --  6.2   ALT (SGPT) U/L  --   --   --   --   --   --   --  12   AST (SGOT) U/L  --   --   --   --   --   --   --  19   BILIRUBIN mg/dL  --   --   --   --   --   --   --  1.0   ALBUMIN g/dL  --   --   --   --   --   --   --  3.30*   GLOBULIN gm/dL  --   --   --   --   --   --   --  2.9   A/G RATIO g/dL  --   --   --   --   --   --   --  1.1     Estimated Creatinine Clearance: 79.6 mL/min (by C-G formula based on SCr of 0.96 mg/dL).        Results from last 7 days  Lab Units 03/17/18 0414 03/14/18  0413 03/12/18  1804   WBC 10*3/mm3  --  8.48 7.77   RBC 10*6/mm3  --  4.57* 4.36*   HEMOGLOBIN g/dL 15.7 15.4 14.6   HEMATOCRIT % 48.8 48.8 46.0   MCV fL  --  106.8* 105.5*   MCH pg  --  33.7* 33.5*   MCHC g/dL  --  31.6* 31.7*   RDW %  --  15.6* 15.9*   RDW-SD fl  --  61.1* 60.9*   MPV fL  --  11.5 11.5   PLATELETS 10*3/mm3 186 172 185   NEUTROPHIL % %  --   --  63.1   LYMPHOCYTE % %  --   --  25.1   MONOCYTES % %  --   --  8.8   EOSINOPHIL % %  --   --  2.7   BASOPHIL % %  --   --  0.3   NEUTROS ABS  10*3/mm3  --   --  4.91   LYMPHS ABS 10*3/mm3  --   --  1.95   MONOS ABS 10*3/mm3  --   --  0.68   EOS ABS 10*3/mm3  --   --  0.21   BASOS ABS 10*3/mm3  --   --  0.02               Results from last 7 days  Lab Units 03/12/18  1804   PROBNP pg/mL 9,589.0*               Results from last 7 days  Lab Units 03/18/18  0413 03/17/18  0414 03/16/18  0411 03/15/18  0439 03/14/18  0413 03/13/18 2014   INR  2.63* 1.94* 1.31* 1.03 1.24* 1.21*     Microbiology Results (last 10 days)     ** No results found for the last 240 hours. **                allopurinol 300 mg Oral Daily   folic acid 1 mg Oral Daily   furosemide 80 mg Oral Daily   metoprolol tartrate 100 mg Oral Q8H   PARoxetine 20 mg Oral Daily   spironolactone 50 mg Oral Daily   warfarin 2.5 mg Oral Daily          Diagnostics:  Xr Chest Pa & Lateral    Result Date: 3/12/2018  AP AND LATERAL CHEST  HISTORY: Atrial fibrillation. Difficulty breathing.  COMPARISON: Portable chest 11/26/2006.  FINDINGS: Heart size is enlarged. There are small bilateral pleural effusions with adjacent basilar atelectasis. There is no evidence for pulmonary edema or pneumothorax or other focal airspace disease. Endplate spurring is present in the thoracic spine. Cholecystectomy clips are present.      Cardiomegaly. Small bilateral pleural effusions with basilar atelectasis.  This report was finalized on 3/12/2018 7:27 PM by Dr. Favio Ayers MD.      Results for orders placed during the hospital encounter of 02/08/18   Adult Transthoracic Echo Complete W/ Cont if Necessary Per Protocol    Narrative · Calculated right ventricular systolic pressure from tricuspid   regurgitation is 19 mmHg.  · Left ventricular wall thickness is consistent with moderate concentric   hypertrophy.  · The following left ventricular wall segments are hypokinetic: mid   anterior, apical anterior, mid anterolateral, mid anteroseptal and basal   anterior.  · Left atrial cavity size is moderately dilated.  · Mildly  reduced right ventricular systolic function noted.  · Left ventricular systolic function is mildly decreased. Estimated EF =   41%.  · Left ventricular diastolic dysfunction (grade III) consistent with   reversible restrictive pattern.          3/12/18 chest x-ray reviewed cardiomegaly small bilateral pleural effusions mild thoracic kyphosis    Active Hospital Problems (** Indicates Principal Problem)    Diagnosis Date Noted   • **Paroxysmal atrial fibrillation [I48.0] 03/13/2018   • Atrial flutter [I48.92] 03/12/2018      Resolved Hospital Problems    Diagnosis Date Noted Date Resolved   No resolved problems to display.     Nocturnal oximetry he spent the over 2 hours with sats less than 89% last night.  Exercise oximetry started at 92% and his sats improved  Assessment/Plan     1. Atrial flutter/paroxysmal atrial fibrillation per cardiology  2. Acute CHF  probably secondary to tachycardia.  Nonischemic cardiomyopathy with LVEF 40%  3. Possible obstructive sleep apnea patient to have outpatient polysomnogram WITH Dr. Ireland.  4. Nocturnal hypoxemia supplemental O2 until he can complete his sleep study 2 L/m.  This has been ordered for discharge.   5. Obstructive lung disease probable COPD difficult to quantitate the extent of the disease in the presence of pleural effusion/CHF.  And I'm not sure how much of his dyspnea is due to his lung disease versus heart disease.  We will try bronchodilator therapy.  Orders for discharge are in the computer  6. Restrictive pulmonary function testing suspect this is not due to intrinsic lung disease . I think that part of it is due to his CHF and pleural effusions he does have some kyphosis and obesity and these also likely contribute.   once he is out of failure his pleural effusions have cleared maybe in a month or so repeat lung function testing would be indicated  7. Diabetes mellitus type 2  8. Hypertension  9. Hyperlipidemia  10. Gross hematuria  11. Obesity    Plan for  disposition: Okay pulmonary standpoint for discharge home at any time.  Follow-up with sleep study and follow-up with Dr. Ireland after sleep study completed. I will see sabrina Connors MD  03/18/18  8:31 AM    Time:

## 2018-05-01 NOTE — TELEPHONE ENCOUNTER
[phone]    Pt called c/o SOA and fatigue.  Pt denies swelling, CP or dizziness.  Pt does not have BP cuff to check BP.  He is taking all meds as prescribed.      He would like an appt.  However there is nothing for RM and TK has appts that are hospital follow up.  Can I use one of these or squeeze in w RM?    Please advise.    [facility]

## 2018-05-03 PROBLEM — I51.9 LEFT VENTRICULAR DYSFUNCTION: Status: ACTIVE | Noted: 2018-01-01

## 2018-05-03 PROBLEM — I50.43 ACUTE ON CHRONIC SYSTOLIC AND DIASTOLIC HEART FAILURE, NYHA CLASS 3 (HCC): Status: ACTIVE | Noted: 2018-01-01

## 2018-05-03 PROBLEM — R29.818 SUSPECTED SLEEP APNEA: Status: ACTIVE | Noted: 2018-01-01

## 2018-05-03 NOTE — TELEPHONE ENCOUNTER
----- Message from ENDER Villafuerte sent at 5/3/2018  9:29 AM EDT -----    Follow up on blood work today

## 2018-05-03 NOTE — TELEPHONE ENCOUNTER
Evelia Velasco NP accidentally printed an RX for Hydrocodone.  This was done in error.  I called Bailey at [facility] and verified that the RX did not go through to the pharmacy and it did not.  Guerita

## 2018-05-03 NOTE — TELEPHONE ENCOUNTER
Please call patient.    TSH and free T4 normal.  Comprehensive metabolic panel normal except for glucose 109, creatinine 0.75, calcium 8.4, total bilirubin 1.3, hemoglobin 13.6, hematocrit 41.8, and RBC 4.01.  Labs are stable continue to follow.  I have forwarded to Dr. Craven.

## 2018-05-03 NOTE — PROGRESS NOTES
Date of Office Visit: 05/10/2018  Encounter Provider: ENDER Laguerre  Place of Service: [facility] CARDIOLOGY  Patient Name: Steve Montana  :1945    Chief Complaint   Patient presents with   • Shortness of Breath   :     HPI: Steve Montana is a 72 y.o. male who presents today for one month cardiac follow-up.  He has any patient to me and his previous records have been reviewed.  He has a past medical history of type 2 diabetes mellitus, essential hypertension, hyperlipidemia, cigar smoker, depression, bladder cancer, coronary artery disease, and status post stent placement.  He reported using 3-4 cigars per day and 3-4 shots of liquor daily.    He presented to our cardiac evaluation center on 18 and was evaluated by Dr. Liz Campos.  He woke up that morning feeling short of breath, weakness, and his heart was racing.  He said he had similar episodes over the past several months.  When he arrived he was in atrial fibrillation with rapid ventricular response.  He informed Dr. Campos that he had recently had influenza and pneumonia.  He reported some mild hematuria.  During his visit and echocardiogram was obtained which revealed an EF of 41%, wall motion abnormalities, left atrium moderately dilated, mildly reduced right ventricular systolic pressure, grade 3 reversible destructive pattern diastolic dysfunction, and moderate LVH.  He was started on Toprol XL 50 mg at nighttime and apixaban 5 mg twice daily.  He was advised to abstain from tobacco and alcohol use.  He was recommended to follow-up with Dr. Lincoln about mild hematuria with his history of bladder cancer.  He was recommended to follow-up with me in one month and to arrange an outpatient stress test at that time.    I evaluated him on 3/12/18 for shortness of breath upon rest and exertion, wheezing, dizziness, and palpitations. He was quite dyspneic on the examination table and his oxygen  "saturation with exertion was 91%.  He was also noted to be in atrial flutter with rapid ventricular response.  He was admitted to Psych[facility] for further evaluation and treatment.  He was placed on IV diuretics and beta blockers for rate control.  Electrophysiology consulted and agreed with rate control and anticoagulation.  Warfarin was initiated.  He was diagnosed with dyslipidemia and started on statin therapy.  He was recommended to have a repeat LFT/lipid panel in 12 weeks.  He was seen by urology for hematuria and recommended to have an outpatient cystoscopy.  He was evaluated by pulmonology for suspected sleep apnea and was placed on BiPAP and nocturnal oxygen.  He was recommended to schedule outpatient formal sleep apnea testing.  He previously was on Toprol-XL 50 mg daily and is now taking metoprolol tartrate 100 mg 3 times a day.  He was recommended to follow-up in our office in 2 weeks and never did so.    He presents today for follow-up and chief concerns of shortness of breath and fatigue since leaving the hospital.  He stopped taking his furosemide 2 weeks ago because he was \"peeing all over myself\".  His dyspnea only occurs with exertion and no longer at rest.  He denies chest pain, PND, orthopnea, cough, edema, palpitations, dizziness, or syncope.  His blood pressure is normal today and his heart rate is 38 bpm.    The following portion of the patient's history were reviewed and updated as appropriate: past medical history, past surgical history, past social history, past family history, allergies, current medications, and problem list.    Past Medical History:   Diagnosis Date   • Abdominal cramping, periumbilical    • Bladder cancer    • Coronary artery disease     Stent placement   • Depression    • Diabetes mellitus type II, controlled, with no complications    • Essential hypertension, benign    • Gout    • Hyperlipidemia    • Injury of back    • Meningioma    • Neoplasm of uncertain behavior " of skin    • Osteoarthritis, multiple sites    • Pancreatitis    • Shortness of breath    • Sinusitis    • Substance abuse    • Tobacco use disorder        Past Surgical History:   Procedure Laterality Date   • BRAIN TUMOR EXCISION Left 2015   • CHOLECYSTECTOMY     • CORONARY STENT PLACEMENT     • KNEE ARTHROSCOPY Left    • SHOULDER ROTATOR CUFF REPAIR Left    • TOTAL HIP ARTHROPLASTY Left 2009       Social History     Social History   • Marital status:      Spouse name: N/A   • Number of children: N/A   • Years of education: N/A     Occupational History   • Not on file.     Social History Main Topics   • Smoking status: Current Every Day Smoker     Years: 0.00     Types: Cigars   • Smokeless tobacco: Never Used      Comment: 2-5 Daily    • Alcohol use 8.4 oz/week     14 Shots of liquor per week      Comment: Caffeine: coffee- rare; Soda- Sprite 3- 12 oz cans daily   • Drug use: No   • Sexual activity: Defer       Family History   Problem Relation Age of Onset   • Depression Mother          at 80   • Pancreatic cancer Father          at 67   • Hypertension Father 40   • Aneurysm Brother         thoracic:   at 59       Review of Systems   Constitution: Positive for malaise/fatigue. Negative for chills, diaphoresis, fever, night sweats, weight gain and weight loss.   HENT: Positive for nosebleeds. Negative for hearing loss, sore throat and tinnitus.    Eyes: Negative for blurred vision, double vision, pain and visual disturbance.   Cardiovascular: Positive for dyspnea on exertion. Negative for chest pain, claudication, cyanosis, irregular heartbeat, leg swelling, near-syncope, orthopnea, palpitations, paroxysmal nocturnal dyspnea and syncope.   Respiratory: Positive for shortness of breath and snoring. Negative for cough, hemoptysis and wheezing.    Endocrine: Negative for cold intolerance, heat intolerance and polyuria.   Hematologic/Lymphatic: Negative for bleeding problem. Does not  bruise/bleed easily.   Skin: Negative for color change, dry skin, flushing and itching.   Musculoskeletal: Positive for joint pain. Negative for falls, joint swelling, muscle cramps, muscle weakness and myalgias.   Gastrointestinal: Negative for abdominal pain, constipation, heartburn, melena, nausea and vomiting.   Genitourinary: Positive for frequency and hematuria. Negative for dysuria.   Neurological: Positive for excessive daytime sleepiness. Negative for dizziness, light-headedness, loss of balance, numbness, paresthesias, seizures and vertigo.   Psychiatric/Behavioral: Positive for depression. Negative for altered mental status, memory loss and substance abuse. The patient is nervous/anxious. The patient does not have insomnia.    Allergic/Immunologic: Negative for environmental allergies.       No Known Allergies      Current Outpatient Prescriptions:   •  albuterol (PROVENTIL HFA;VENTOLIN HFA) 108 (90 Base) MCG/ACT inhaler, Inhale 2 puffs Every 4 (Four) Hours As Needed for Wheezing., Disp: 6.7 g, Rfl: 6  •  cyanocobalamin ([employer] VITAMIN B-12) 1000 MCG tablet, 1 tablet daily, Disp: 30 tablet, Rfl: 5  •  folic acid (FOLVITE) 1 MG tablet, Take 1 tablet by mouth Daily. (Patient taking differently: Take 4 mg by mouth Every 7 (Seven) Days.), Disp: 30 tablet, Rfl: 5  •  HYDROcodone-acetaminophen (NORCO) 7.5-325 MG per tablet, Take 1 tablet by mouth Every 6 (Six) Hours As Needed for Severe Pain ., Disp: 120 tablet, Rfl: 0  •  irbesartan (AVAPRO) 300 MG tablet, Take 1 tablet by mouth Daily., Disp: 90 tablet, Rfl: 1  •  meclizine (ANTIVERT) 25 MG tablet, Take 1 tablet by mouth Every 12 (Twelve) Hours As Needed for dizziness., Disp: , Rfl: 2  •  nitroglycerin (NITROSTAT) 0.4 MG SL tablet, Place 1 tablet under the tongue Every 5 (Five) Minutes As Needed for Chest Pain. Take no more than 3 doses in 15 minutes., Disp: 25 tablet, Rfl: 0  •  PARoxetine (PAXIL) 20 MG tablet, Take 1 tablet by mouth Daily., Disp: 90 tablet,  "Rfl: 1  •  spironolactone (ALDACTONE) 25 MG tablet, Take 1 tablet by mouth Daily., Disp: 90 tablet, Rfl: 2  •  umeclidinium-vilanterol (ANORO ELLIPTA) 62.5-25 MCG/INH aerosol powder  inhaler, Inhale 1 puff Daily., Disp: 1 each, Rfl: 6  •  warfarin (COUMADIN) 2.5 MG tablet, Take 1 tablet by mouth Daily., Disp: 30 tablet, Rfl: 2  •  metoprolol tartrate (LOPRESSOR) 25 MG tablet, Take 1 tablet by mouth 2 (Two) Times a Day., Disp: 60 tablet, Rfl: 1      Objective:     Vitals:    05/03/18 0845   BP: 110/72   Pulse: (!) 38   Weight: 106 kg (233 lb 12.8 oz)   Height: 172.7 cm (68\")     Body mass index is 35.55 kg/m².    PHYSICAL EXAM:    Vitals Reviewed.   General Appearance: No acute distress, well developed and well nourished. Obese.  Eyes: Conjunctiva and lids: No erythema, swelling, or discharge. Sclera non-icteric. Glasses.   HENT: Atraumatic, normocephalic. External eyes, ears, and nose normal. No hearing loss noted. Mucous membranes normal. Lips not cyanotic. Neck supple with no tenderness.  Respiratory: No signs of respiratory distress. Respiration rhythm and depth normal. Diminished lung sounds.   Cardiovascular:  Jugular Venous Pressure: Normal  Heart Rate and Rhythm: Regular rhythm; bradycardic.  Heart Sounds: Normal S1 and S2. No S3 or S4 noted.  Murmurs: No murmurs noted. No rubs, thrills, or gallops.   Arterial Pulses: Carotid pulses normal. No carotid bruit noted. Posterior tibialis and dorsalis pedis pulses normal.   Lower Extremities: No edema noted.  Gastrointestinal:  Abdomen soft, non-distended, non-tender. Normal bowel sounds. No hepatomegaly.   Musculoskeletal: Normal movement of extremities  Skin and Nails: General appearance normal. No pallor, cyanosis, diaphoresis. Skin temperature normal. No clubbing of fingernails.   Psychiatric: Patient alert and oriented to person, place, and time. Speech and behavior appropriate. Normal mood and affect.       ECG 12 Lead  Date/Time: 5/10/2018 9:37 AM  Performed " by: ADELAIDE JEROME  Authorized by: ADELAIDE JEROME   Comparison: compared with previous ECG from 5/3/2018  Comparison to previous ECG: Sinus bradycardia, heart rate 38  Rhythm: sinus bradycardia  Ectopy: atrial premature contractions  Rate: bradycardic  BPM: 49  Conduction: right bundle branch block  ST Segments: ST segments normal  Clinical impression: abnormal ECG  Comments: T wave abnormalities               Assessment:       Diagnosis Plan   1. Dyspnea on exertion  CBC Auto Differential    Comprehensive Metabolic Panel    proBNP   2. Other fatigue  CBC Auto Differential    Comprehensive Metabolic Panel    proBNP   3. Sinus bradycardia  CBC Auto Differential    Comprehensive Metabolic Panel    proBNP   4. Atrial flutter, unspecified type  TSH    T4, Free   5. Paroxysmal atrial fibrillation  CBC Auto Differential    Comprehensive Metabolic Panel    proBNP    TSH    T4, Free   6. Left ventricular dysfunction     7. Suspected sleep apnea     8. Gross hematuria     9. Alcohol dependence in remission     10. Cigar smoker            Plan:       1. Sinus Bradycardia: He is currently in normal sinus rhythm, but is bradycardic with heart rate of 38 bpm. He is having worsening symptoms of shortness of breath and fatigue. He is taking metoprolol tartrate 100 mg three times daily. I discussed with Dr. Campos and we have recommended decreasing metoprolol tartrate 50 mg twice daily. CBC, CMP, BNP, TSH to be completed today.     2. Atrial flutter/fibrillation: He is currently in NSR. Decrease dose of metoprolol tartrate and continue with warfarin.     Atrial Fibrillation and Atrial Flutter  Assessment  • The patient has paroxysmal atrial fibrillation and paroxysmal atrial flutter  • The patient's CHADS2-VASc score is 4  • A [id number] score of 2 or more is considered a high risk for a thromboembolic event    Plan  • Attempt to maintain sinus rhythm  • Continue warfarin for antithrombotic therapy, bleeding issues  discussed  • Continue beta blocker for rate control    2.  Left Ventricular dysfunction: His recent ejection fraction was noted at 41% and he has grade 3 diastolic dysfunction.  He stopped his furosemide because of urinary incontinence.  He will follow-up with me next week and will reassess if we need to restart the furosemide.    Heart Failure  Assessment  • NYHA class III-A - There is limitation of physical activity. The patient is comfortable at rest, but ordinary activity causes fatigue, palpitations or shortness of breath.  • Beta blocker prescribed  • Diuretics prescribed  • Angiotensin receptor blocker (ARB) prescribed  • Left ventricular function is mildly reduced by qualitative assessment    Plan  • The patient has received heart failure education on the following topics: medication instructions, smoking cessation, symptom management, physical activity, weight monitoring and minimizing alcohol intake    Subjective/Objective  • The patient reports dyspnea        3.  Suspected Sleep Apnea: He will be following up with pulmonary and having a sleep apnea evaluation the near future.    4.  Essential Hypertension: Blood pressure normal today.  We will continue to monitor.    5.  Alcohol dependence and cigar smoker: He states he is now to resume one to 2 alcoholic beverages 5 days per week and a cigar on occasion.    6.  Hematuria: He continues to experience hematuria, but he states it has improved. He will be having an outpatient cystoscopy in the near future.  Urology to follow.      7.  I've recommended follow-up with me in one week and Dr. Liz Campos in 4 weeks.      As always, it has been a pleasure to participate in your patient's care.      Sincerely,         ENDER Maciel

## 2018-05-10 PROBLEM — R53.83 OTHER FATIGUE: Status: ACTIVE | Noted: 2018-01-01

## 2018-05-10 PROBLEM — I48.91 NEW ONSET A-FIB (HCC): Status: RESOLVED | Noted: 2018-01-01 | Resolved: 2018-01-01

## 2018-05-10 PROBLEM — R00.1 SINUS BRADYCARDIA: Status: ACTIVE | Noted: 2018-01-01

## 2018-05-10 NOTE — PROGRESS NOTES
Date of Office Visit: 05/10/2018  Encounter Provider: ENDER Laguerre  Place of Service: [facility] CARDIOLOGY  Patient Name: Steve Montana  :1945    Chief Complaint   Patient presents with   • Follow-up   :     HPI: Steve Montana is a 72 y.o. male who presents today for follow-up. He has a past medical history of type 2 diabetes mellitus, essential hypertension, hyperlipidemia, cigar smoker, depression, bladder cancer, coronary artery disease, and status post stent placement.  He reported using 3-4 cigars per day and 3-4 shots of liquor daily.He has a history of bladder cancer and hematuria currently followed by Dr. Lincoln.      He presented to our cardiac evaluation center on 18 and He was diagnosed with atrial fibrillation with RVR by Dr. Liz Campos.  He had been experiencing episodes over the past several months feeling short of breath, weakness, and racing heartbeat.  Echocardiogram was obtained which revealed an EF of 41%, wall motion abnormalities, left atrium moderately dilated, mildly reduced right ventricular systolic pressure, grade 3 reversible destructive pattern diastolic dysfunction, and moderate LVH.  He was started on Toprol XL 50 mg at nighttime and apixaban 5 mg twice daily.  He was advised to abstain from tobacco and alcohol use.     I evaluated him on 3/12/18 for symptoms of shortness of breath upon rest and exertion, wheezing, dizziness, and palpitations.  He was in atrial flutter with rapid ventricular response and oxygen saturation was 91%.  I admitted him to [facility] for diuresis and rate control.  He was admitted to [facility] for further evaluation and treatment.  He was placed on IV diuretics and beta blockers for rate control.  Electrophysiology consulted and agreed with rate control and anticoagulation.  Warfarin was initiated and metoprolol tartrate increased to 100 mg 3 times daily.    I then saw him in the office  on 5/3 and he was bradycardic with a heart rate of 38 bpm.  I decreased his metoprolol tartrate to 50 mg twice daily.  I did not order a stress test during that visit because of the bradycardia.  He is now on spironolactone, and no longer taking the furosemide.  I ordered blood work with the following results: TSH and free T4 normal.,  Comprehensive metabolic panel normal except for glucose 109, creatinine 0.75, calcium 8.4, total bilirubin 1.3, hemoglobin 13.6, hematocrit 41.8, and RBC 4.01.    He presents today for follow-up and his heart rate remains low at 49 bpm.  He continues to experience dyspnea upon exertion and his lower extremity edema according to him has resolved.  His hematuria has also improved.  He remains on warfarin and is INRs are checked here.  He denies chest pain, palpitations, dizziness, or syncope.    The following portion of the patient's history were reviewed and updated as appropriate: past medical history, past surgical history, past social history, past family history, allergies, current medications, and problem list.    Past Medical History:   Diagnosis Date   • Abdominal cramping, periumbilical    • Bladder cancer    • Coronary artery disease     Stent placement   • Depression    • Diabetes mellitus type II, controlled, with no complications    • Essential hypertension, benign    • Gout    • Hyperlipidemia    • Injury of back    • Meningioma    • Neoplasm of uncertain behavior of skin    • Osteoarthritis, multiple sites    • Pancreatitis    • Shortness of breath    • Sinusitis    • Substance abuse    • Tobacco use disorder        Past Surgical History:   Procedure Laterality Date   • BRAIN TUMOR EXCISION Left 11/2015   • CHOLECYSTECTOMY     • CORONARY STENT PLACEMENT     • KNEE ARTHROSCOPY Left    • SHOULDER ROTATOR CUFF REPAIR Left    • TOTAL HIP ARTHROPLASTY Left 2009       Social History     Social History   • Marital status:      Spouse name: N/A   • Number of children: N/A    • Years of education: N/A     Occupational History   • Not on file.     Social History Main Topics   • Smoking status: Current Every Day Smoker     Years: 0.00     Types: Cigars   • Smokeless tobacco: Never Used      Comment: 2-5 Daily    • Alcohol use 8.4 oz/week     14 Shots of liquor per week      Comment: Caffeine: coffee- rare; Soda- Sprite 3- 12 oz cans daily   • Drug use: No   • Sexual activity: Defer       Family History   Problem Relation Age of Onset   • Depression Mother          at 80   • Pancreatic cancer Father          at 67   • Hypertension Father 40   • Aneurysm Brother         thoracic:   at 59       Review of Systems   Constitution: Positive for malaise/fatigue. Negative for chills, diaphoresis, fever, night sweats, weight gain and weight loss.   HENT: Negative for hearing loss, nosebleeds, sore throat and tinnitus.    Eyes: Negative for blurred vision, double vision, pain and visual disturbance.   Cardiovascular: Positive for dyspnea on exertion. Negative for chest pain, claudication, cyanosis, irregular heartbeat, leg swelling, near-syncope, orthopnea, palpitations, paroxysmal nocturnal dyspnea and syncope.   Respiratory: Positive for shortness of breath and snoring. Negative for cough, hemoptysis and wheezing.    Endocrine: Negative for cold intolerance, heat intolerance and polyuria.   Hematologic/Lymphatic: Negative for bleeding problem. Does not bruise/bleed easily.   Skin: Negative for color change, dry skin, flushing and itching.   Musculoskeletal: Positive for joint pain. Negative for falls, joint swelling, muscle cramps, muscle weakness and myalgias.   Gastrointestinal: Negative for abdominal pain, constipation, heartburn, melena, nausea and vomiting.   Genitourinary: Positive for frequency. Negative for dysuria and hematuria.   Neurological: Positive for excessive daytime sleepiness. Negative for dizziness, light-headedness, loss of balance, numbness,  paresthesias, seizures and vertigo.   Psychiatric/Behavioral: Positive for depression. Negative for altered mental status, memory loss and substance abuse. The patient is nervous/anxious. The patient does not have insomnia.    Allergic/Immunologic: Negative for environmental allergies.       No Known Allergies      Current Outpatient Prescriptions:   •  albuterol (PROVENTIL HFA;VENTOLIN HFA) 108 (90 Base) MCG/ACT inhaler, Inhale 2 puffs Every 4 (Four) Hours As Needed for Wheezing., Disp: 6.7 g, Rfl: 6  •  cyanocobalamin ([employer] VITAMIN B-12) 1000 MCG tablet, 1 tablet daily, Disp: 30 tablet, Rfl: 5  •  folic acid (FOLVITE) 1 MG tablet, Take 1 tablet by mouth Daily. (Patient taking differently: Take 4 mg by mouth Every 7 (Seven) Days.), Disp: 30 tablet, Rfl: 5  •  HYDROcodone-acetaminophen (NORCO) 7.5-325 MG per tablet, Take 1 tablet by mouth Every 6 (Six) Hours As Needed for Severe Pain ., Disp: 120 tablet, Rfl: 0  •  irbesartan (AVAPRO) 300 MG tablet, Take 1 tablet by mouth Daily., Disp: 90 tablet, Rfl: 1  •  meclizine (ANTIVERT) 25 MG tablet, Take 1 tablet by mouth Every 12 (Twelve) Hours As Needed for dizziness., Disp: , Rfl: 2  •  nitroglycerin (NITROSTAT) 0.4 MG SL tablet, Place 1 tablet under the tongue Every 5 (Five) Minutes As Needed for Chest Pain. Take no more than 3 doses in 15 minutes., Disp: 25 tablet, Rfl: 0  •  PARoxetine (PAXIL) 20 MG tablet, Take 1 tablet by mouth Daily., Disp: 90 tablet, Rfl: 1  •  spironolactone (ALDACTONE) 25 MG tablet, Take 1 tablet by mouth Daily., Disp: 90 tablet, Rfl: 2  •  umeclidinium-vilanterol (ANORO ELLIPTA) 62.5-25 MCG/INH aerosol powder  inhaler, Inhale 1 puff Daily., Disp: 1 each, Rfl: 6  •  warfarin (COUMADIN) 2.5 MG tablet, Take 1 tablet by mouth Daily., Disp: 30 tablet, Rfl: 2  •  metoprolol tartrate (LOPRESSOR) 25 MG tablet, Take 1 tablet by mouth 2 (Two) Times a Day., Disp: 60 tablet, Rfl: 1      Objective:     Vitals:    05/10/18 0938   BP: 138/72   Pulse: (!) 49  "  Weight: 107 kg (236 lb)   Height: 172.7 cm (68\")     Body mass index is 35.88 kg/m².    PHYSICAL EXAM:    Vitals Reviewed.   General Appearance: No acute distress, well developed and well nourished. Obese.  Eyes: Conjunctiva and lids: No erythema, swelling, or discharge. Sclera non-icteric. Glasses.   HENT: Atraumatic, normocephalic. External eyes, ears, and nose normal. No hearing loss noted. Mucous membranes normal. Lips not cyanotic. Neck supple with no tenderness.  Respiratory: No signs of respiratory distress. Respiration rhythm and depth normal. Diminished lung sounds.   Cardiovascular:  Jugular Venous Pressure: Normal  Heart Rate and Rhythm: Regular rhythm; bradycardic.  Heart Sounds: Normal S1 and S2. No S3 or S4 noted.  Murmurs: No murmurs noted. No rubs, thrills, or gallops.   Arterial Pulses: Carotid pulses normal. No carotid bruit noted. Posterior tibialis and dorsalis pedis pulses normal.   Lower Extremities: No edema noted.  Gastrointestinal:  Abdomen soft, non-distended, non-tender. Normal bowel sounds. No hepatomegaly.   Musculoskeletal: Normal movement of extremities  Skin and Nails: General appearance normal. No pallor, cyanosis, diaphoresis. Skin temperature normal. No clubbing of fingernails.   Psychiatric: Patient alert and oriented to person, place, and time. Speech and behavior appropriate. Normal mood and affect.       ECG 12 Lead  Date/Time: 5/10/2018 9:37 AM  Performed by: ADELAIDE JEROME  Authorized by: ADELAIDE JEROME   Comparison: compared with previous ECG from 5/3/2018  Similar to previous ECG  Rhythm: sinus bradycardia  Ectopy: atrial premature contractions  Rate: bradycardic  BPM: 49  Conduction: right bundle branch block  ST Segments: ST segments normal  T Waves: T waves normal  Other: no other findings  Clinical impression: abnormal ECG              Assessment:      1. Sinus Bradycardia  2. Atrial Flutter/Fibrillation  3. Left Ventricular Dysfunction  4. Hypertension  5. " Suspected Sleep Apnea  6.  Hematuria  7.  Cigar smoker and alcohol use         Plan:       1. Sinus Bradycardia: He continues to be bradycardic despite the lowering of the beta blocker.  I'll further reduce metoprolol tartrate to 25 mg twice daily.  I will also arrange for a cardiac PET scan to be completed in the near future.    2. Atrial flutter/fibrillation: He is currently in NSR. Decrease dose of metoprolol tartrate and continue with warfarin.  INR is checked here in our office.    Atrial Fibrillation and Atrial Flutter  Assessment  • The patient has paroxysmal atrial fibrillation and paroxysmal atrial flutter  • The patient's CHADS2-VASc score is 4  • A [id number] score of 2 or more is considered a high risk for a thromboembolic event    Plan  • Attempt to maintain sinus rhythm  • Continue warfarin for antithrombotic therapy, bleeding issues discussed  • Continue beta blocker for rate control    2.  Left Ventricular dysfunction: His recent ejection fraction was noted at 41% and he has grade 3 diastolic dysfunction.  He stopped his furosemide because of urinary incontinence and is taking spironolactone.  He appears better compensated today.    Heart Failure  Assessment  • NYHA class III-A - There is limitation of physical activity. The patient is comfortable at rest, but ordinary activity causes fatigue, palpitations or shortness of breath.  • Beta blocker prescribed  • Diuretics prescribed  • Angiotensin receptor blocker (ARB) prescribed  • Left ventricular function is mildly reduced by qualitative assessment    Plan  • The patient has received heart failure education on the following topics: medication instructions, smoking cessation, symptom management, physical activity, weight monitoring and minimizing alcohol intake    Subjective/Objective  • The patient reports dyspnea        3.  Suspected Sleep Apnea: He will be following up with pulmonary and having a sleep apnea evaluation the near future.    4.   Essential Hypertension: Blood pressure normal today and we will continue to monitor.    5.  Alcohol dependence and cigar smoker: He states he is now to resume one to 2 alcoholic beverages 5 days per week and has a cigar on occasion.    6.  Hematuria: He continues to experience hematuria, but he states it has improved. He will be having an outpatient cystoscopy in the near future.  Urology to follow.      7.  He is scheduled to follow up with Dr. Campos in June.     As always, it has been a pleasure to participate in your patient's care.      Sincerely,         ENDER Maciel

## 2018-05-16 NOTE — TELEPHONE ENCOUNTER
Please call patient and tell him that we want to proceed with a cardiac PET scan to be completed to rule out the possibility of coronary artery disease.  Please have scheduling arrange.  Thank you

## 2018-05-17 NOTE — TELEPHONE ENCOUNTER
Pt called back and would like an appointment in the middle of the week during the afternoon.  Yonny, can you please call Mr. Montana and schedule a cardiac  PET scan?  Thanks/[facility]    # [phone]

## 2018-05-22 NOTE — TELEPHONE ENCOUNTER
----- Message from ENDER Villafuerte sent at 5/16/2018  4:50 PM EDT -----    Follow up on cardiac PET scan

## 2018-05-24 NOTE — TELEPHONE ENCOUNTER
----- Message from ENDER Villafuerte sent at 5/10/2018 10:04 AM EDT -----    Call in 2 weeks for BP and HR (more concerned about HR and fatigue)   Confirm that he is taking metoprolol tartrate 25 mg twice daily

## 2018-05-25 NOTE — TELEPHONE ENCOUNTER
Called and s/w pt.  He states he is less short of air and feels like his HR is normal.   I confirmed his Metoprolol Tart 25mg BID.  Pt states he misunderstood and is only taking the Metoprolol Tart QD.  Would you like him to start taking it BID?   Please advise/jlf    # [phone]

## 2018-06-06 NOTE — TELEPHONE ENCOUNTER
----- Message from ENDER Villafuerte sent at 5/24/2018  1:27 PM EDT -----    Follow-up on cardiac PET scan scheduled 6/6/18

## 2018-06-06 NOTE — TELEPHONE ENCOUNTER
Cardiac PET Scan:    · Myocardial perfusion imaging indicates a normal myocardial perfusion study with no evidence of ischemia.  · Left ventricular ejection fraction is normal (Calculated EF = 55%).  · Impressions are consistent with a low risk study.      Please call patient and tell him stress test is normal.  Continue to take metoprolol tartrate 25 mg twice daily.  He is scheduled to follow-up with Dr. Campos in June.

## 2018-06-11 NOTE — TELEPHONE ENCOUNTER
Please call patient and see how he is tolerating metoprolol tartrate 25 mg twice daily.  How are his blood pressure and heart rates at home?

## 2018-06-11 NOTE — TELEPHONE ENCOUNTER
----- Message from ENDER Villafuerte sent at 5/25/2018  4:01 PM EDT -----    Follow-up on increased beta blocker (see telephone correspondence on 5/25)

## 2018-06-12 NOTE — TELEPHONE ENCOUNTER
I called and s/w pt.  He states he is tolerating the increase of the Metoprolol Tart.  He does not have a home BP monitor to check at home.  He checks his BP when he is at [facility].  He will check it next time he is there.  Mr. Montana has an appt with Dr. Campos next week(6/19)./smith

## 2018-06-21 PROBLEM — I50.43 ACUTE ON CHRONIC SYSTOLIC AND DIASTOLIC HEART FAILURE, NYHA CLASS 3 (HCC): Status: RESOLVED | Noted: 2018-01-01 | Resolved: 2018-01-01

## 2018-06-21 NOTE — PROGRESS NOTES
Date of Office Visit: 2018  Encounter Provider: Liz Campos MD  Place of Service: [facility] CARDIOLOGY  Patient Name: Steve Montana  :1945      Patient ID:  Steve Montana is a 72 y.o. male is here for  followup for atrial flutter and cardiomyopathy.        History of Present Illness    He has a past medical history of type 2 diabetes mellitus, essential hypertension, hyperlipidemia, cigar smoker, depression, bladder cancer, coronary artery disease, and status post stent placement.  He reported using 3-4 cigars per day and 3-4 shots of liquor daily.     He presented to our cardiac evaluation center on 18.  He woke up that morning feeling short of breath, weakness, and his heart was racing.  He said he had similar episodes over the past several months.  When he arrived he was in atrial fibrillation with rapid ventricular response.  He had had influenza and pneumonia.  He reported some mild hematuria.  During his visit and echocardiogram was obtained which revealed an EF of 41%, wall motion abnormalities, left atrium moderately dilated, mildly reduced right ventricular systolic pressure, grade 3 reversible diastolic dysfunction, and moderate LVH.  He was started on Toprol XL 50 mg at nighttime and apixaban 5 mg twice daily.  He was advised to abstain from tobacco and alcohol use.       I evaluated him on 3/12/18 for shortness of breath upon rest and exertion, wheezing, dizziness, and palpitations. He was quite dyspneic on the examination table and his oxygen saturation with exertion was 91%.  He was also noted to be in atrial flutter with rapid ventricular response.  He was admitted to [facility] for further evaluation and treatment.  He was placed on IV diuretics and beta blockers for rate control.  Electrophysiology consulted and agreed with rate control and anticoagulation.  Warfarin was initiated and eliquis stopped.  He was diagnosed with  dyslipidemia and started on statin therapy.  He was recommended to have a repeat LFT/lipid panel in 12 weeks.  He was seen by urology for hematuria and recommended to have an outpatient cystoscopy.  He was evaluated by pulmonology for suspected sleep apnea and was placed on BiPAP and nocturnal oxygen.  He was recommended to schedule outpatient formal sleep apnea testing.      He had a nonischemic stress PET study done 6/6/18.      Past Medical History:   Diagnosis Date   • Abdominal cramping, periumbilical    • Alcohol dependence in remission    • Atrial flutter    • Bladder cancer    • Cigar smoker    • Coronary artery disease     Stent placement   • Depression    • Diabetes mellitus type II, controlled, with no complications    • Dyspnea on exertion    • Essential hypertension, benign    • Gout    • Gross hematuria    • Hyperlipidemia    • Injury of back    • Left ventricular dysfunction    • Meningioma    • Neoplasm of uncertain behavior of skin    • Osteoarthritis, multiple sites    • Other fatigue    • PAF (paroxysmal atrial fibrillation)    • Pancreatitis    • Shortness of breath    • Sinus bradycardia    • Sinusitis    • Substance abuse    • Suspected sleep apnea    • Tobacco use disorder          Past Surgical History:   Procedure Laterality Date   • BRAIN TUMOR EXCISION Left 11/2015   • CHOLECYSTECTOMY     • CORONARY STENT PLACEMENT     • KNEE ARTHROSCOPY Left    • SHOULDER ROTATOR CUFF REPAIR Left    • TOTAL HIP ARTHROPLASTY Left 2009       Current Outpatient Prescriptions on File Prior to Visit   Medication Sig Dispense Refill   • albuterol (PROVENTIL HFA;VENTOLIN HFA) 108 (90 Base) MCG/ACT inhaler Inhale 2 puffs Every 4 (Four) Hours As Needed for Wheezing. 6.7 g 6   • cyanocobalamin ([employer] VITAMIN B-12) 1000 MCG tablet 1 tablet daily 30 tablet 5   • folic acid (FOLVITE) 1 MG tablet Take 1 tablet by mouth Daily. (Patient taking differently: Take 4 mg by mouth Every 7 (Seven) Days.) 30 tablet 5   •  HYDROcodone-acetaminophen (NORCO) 7.5-325 MG per tablet Take 1 tablet by mouth Every 6 (Six) Hours As Needed for Severe Pain . 120 tablet 0   • irbesartan (AVAPRO) 300 MG tablet Take 1 tablet by mouth Daily. 90 tablet 1   • meclizine (ANTIVERT) 25 MG tablet Take 1 tablet by mouth Every 12 (Twelve) Hours As Needed for dizziness.  2   • metoprolol tartrate (LOPRESSOR) 25 MG tablet Take 1 tablet by mouth 2 (Two) Times a Day. (Patient taking differently: Take 25 mg by mouth Daily.) 60 tablet 1   • nitroglycerin (NITROSTAT) 0.4 MG SL tablet Place 1 tablet under the tongue Every 5 (Five) Minutes As Needed for Chest Pain. Take no more than 3 doses in 15 minutes. 25 tablet 0   • PARoxetine (PAXIL) 20 MG tablet Take 1 tablet by mouth Daily. 90 tablet 1   • spironolactone (ALDACTONE) 25 MG tablet Take 1 tablet by mouth Daily. 90 tablet 2   • umeclidinium-vilanterol (ANORO ELLIPTA) 62.5-25 MCG/INH aerosol powder  inhaler Inhale 1 puff Daily. 1 each 6   • warfarin (COUMADIN) 2.5 MG tablet Take 1 tablet by mouth Daily. 30 tablet 2     No current facility-administered medications on file prior to visit.        Social History     Social History   • Marital status:      Spouse name: N/A   • Number of children: N/A   • Years of education: N/A     Occupational History   • Not on file.     Social History Main Topics   • Smoking status: Current Every Day Smoker     Years: 0.00     Types: Cigars   • Smokeless tobacco: Never Used   • Alcohol use 8.4 oz/week     14 Shots of liquor per week      Comment: Caffeine: coffee- rare; Soda- Sprite 3-12 oz cans daily   • Drug use: No   • Sexual activity: Defer     Other Topics Concern   • Not on file     Social History Narrative   • No narrative on file           Review of Systems   Constitution: Positive for malaise/fatigue.   HENT: Negative for congestion.    Eyes: Negative for vision loss in left eye and vision loss in right eye.   Respiratory: Negative.  Negative for cough, hemoptysis,  "shortness of breath, sleep disturbances due to breathing, snoring, sputum production and wheezing.    Endocrine: Negative.    Hematologic/Lymphatic: Negative.    Skin: Negative for poor wound healing and rash.   Musculoskeletal: Negative for falls, gout, muscle cramps and myalgias.   Gastrointestinal: Negative for abdominal pain, diarrhea, dysphagia, hematemesis, melena, nausea and vomiting.   Neurological: Negative for excessive daytime sleepiness, dizziness, headaches, light-headedness, loss of balance, seizures and vertigo.   Psychiatric/Behavioral: Negative for depression and substance abuse. The patient is not nervous/anxious.        Procedures    ECG 12 Lead  Date/Time: 6/21/2018 1:40 PM  Performed by: GUIDO MCCRARY  Authorized by: GUIDO MCCRARY   Comparison: compared with previous ECG   Comparison to previous ECG: Now atrial flutter  Rhythm: atrial flutter  Conduction: right bundle branch block  T depression: V2, V3, V4, V5, V6, II, III and aVF  Clinical impression: abnormal ECG                Objective:      Vitals:    06/21/18 1324   BP: 130/80   BP Location: Right arm   Patient Position: Sitting   Pulse: (!) 136   Weight: 117 kg (259 lb)   Height: 172.7 cm (68\")     Body mass index is 39.38 kg/m².    Physical Exam   Constitutional: He is oriented to person, place, and time. He appears well-developed and well-nourished. No distress.   HENT:   Head: Normocephalic and atraumatic.   Eyes: Conjunctivae are normal. No scleral icterus.   Neck: Neck supple. No JVD present. Carotid bruit is not present. No thyromegaly present.   Cardiovascular: Normal rate, regular rhythm, S1 normal, S2 normal, normal heart sounds and intact distal pulses.   No extrasystoles are present. PMI is not displaced.  Exam reveals no gallop.    No murmur heard.  Pulses:       Carotid pulses are 2+ on the right side, and 2+ on the left side.       Radial pulses are 2+ on the right side, and 2+ on the left side.        Dorsalis " pedis pulses are 2+ on the right side, and 2+ on the left side.        Posterior tibial pulses are 2+ on the right side, and 2+ on the left side.   Pulmonary/Chest: Effort normal and breath sounds normal. No respiratory distress. He has no wheezes. He has no rhonchi. He has no rales. He exhibits no tenderness.   Abdominal: Soft. Bowel sounds are normal. He exhibits no distension, no abdominal bruit and no mass. There is no tenderness.   Musculoskeletal: He exhibits no edema or deformity.   Lymphadenopathy:     He has no cervical adenopathy.   Neurological: He is alert and oriented to person, place, and time. No cranial nerve deficit.   Skin: Skin is warm and dry. No rash noted. He is not diaphoretic. No cyanosis. No pallor. Nails show no clubbing.   Psychiatric: He has a normal mood and affect. Judgment normal.   Vitals reviewed.      Lab Review:       Assessment:      Diagnosis Plan   1. Atrial flutter, unspecified type     2. Paroxysmal atrial fibrillation     3. Essential hypertension     4. Left ventricular dysfunction       1. Atrial flutter on warfarin, heart rate is fast. Is very fatigued on metoprolol, d/c and start diltiazem 60mg BID.   2. Hypertension  3. Cardiomyopathy. This was while in atrial flutter with RVR.  He had a normal PET stress with normal LVEF 6/6/18.   4. BUFFY  5. Obesity.   6. Gout  7. Bladder cancer with h/o hematuria, sees Dr. Lincoln.   8. Alcohol dependence, advised cessation.   9. Tobacco use.      Plan:       ECG in 1 week, see Evelia in 1 month.  Target heart rate is <100bpm.       Atrial Fibrillation and Atrial Flutter  Assessment  • The patient has paroxysmal atrial flutter  • The patient's CHADS2-VASc score is 2  • A [id number] score of 2 or more is considered a high risk for a thromboembolic event  • Warfarin prescribed    Plan  • Continue in atrial fibrillation with rate control  • Continue warfarin for antithrombotic therapy, bleeding issues discussed  • Add diltiazem for rate  control

## 2018-06-26 NOTE — TELEPHONE ENCOUNTER
----- Message from ENDER Villafuerte sent at 5/3/2018  9:23 AM EDT -----    Needs lipid panel and liver enzymes after start of statin

## 2018-06-26 NOTE — TELEPHONE ENCOUNTER
Patient is coming in on 6/28 for a blood pressure check.  See if he can have a fasting lipid panel and blood work completed at the main hospital.  Orders have been placed.

## 2018-06-28 PROBLEM — I48.0 PAROXYSMAL ATRIAL FIBRILLATION (HCC): Status: RESOLVED | Noted: 2018-01-01 | Resolved: 2018-01-01

## 2018-06-28 NOTE — PROGRESS NOTES
Date of Office Visit: 2018  Encounter Provider: Liz Campos MD  Place of Service: [facility] CARDIOLOGY  Patient Name: Steve Montana  :1945      Patient ID:  Steve Montana is a 72 y.o. male is here for  followup for a fib with rVR.         History of Present Illness       He has a past medical history of type 2 diabetes mellitus, essential hypertension, hyperlipidemia, cigar smoker, depression, bladder cancer, coronary artery disease, and status post stent placement.  He reported using 3-4 cigars per day and 3-4 shots of liquor daily.     He presented to our cardiac evaluation center on 18.  He woke up that morning feeling short of breath, weakness, and his heart was racing.  He said he had similar episodes over the past several months.  When he arrived he was in atrial fibrillation with rapid ventricular response.  He had had influenza and pneumonia.  He reported some mild hematuria.  During his visit and echocardiogram was obtained which revealed an EF of 41%, wall motion abnormalities, left atrium moderately dilated, mildly reduced right ventricular systolic pressure, grade 3 reversible diastolic dysfunction, and moderate LVH.  He was started on Toprol XL 50 mg at nighttime and apixaban 5 mg twice daily.  He was advised to abstain from tobacco and alcohol use.       I evaluated him on 3/12/18 for shortness of breath upon rest and exertion, wheezing, dizziness, and palpitations. He was quite dyspneic on the examination table and his oxygen saturation with exertion was 91%.  He was also noted to be in atrial flutter with rapid ventricular response.  He was admitted to [facility] for further evaluation and treatment.  He was placed on IV diuretics and beta blockers for rate control.  Electrophysiology consulted and agreed with rate control and anticoagulation.  Warfarin was initiated and eliquis stopped.  He was diagnosed with dyslipidemia and started  on statin therapy.  He was recommended to have a repeat LFT/lipid panel in 12 weeks.  He was seen by urology for hematuria and recommended to have an outpatient cystoscopy.  He was evaluated by pulmonology for suspected sleep apnea and was placed on BiPAP and nocturnal oxygen.  He was recommended to schedule outpatient formal sleep apnea testing.       He had a nonischemic stress PET study done 6/6/18.    He came in today for an ECG check and he was in atrial fibrillation with rapid ventricular response.  He's been short winded and for the past 2 weeks he's gained some fluid.  He is no longer taking furosemide because of said caused him to go the bathroom too much.  He doesn't feels heart racing.  Had no dizziness, falls or syncope.  He has no chest pain or pressure.  He has no slower extremity edema.  He has no orthopnea or PND.    Past Medical History:   Diagnosis Date   • Abdominal cramping, periumbilical    • Alcohol dependence in remission    • Atrial flutter    • Bladder cancer    • Cigar smoker    • Coronary artery disease     Stent placement   • Depression    • Diabetes mellitus type II, controlled, with no complications    • Dyspnea on exertion    • Essential hypertension, benign    • Gout    • Gross hematuria    • Hyperlipidemia    • Injury of back    • Left ventricular dysfunction    • Meningioma    • Neoplasm of uncertain behavior of skin    • Osteoarthritis, multiple sites    • Other fatigue    • PAF (paroxysmal atrial fibrillation)    • Pancreatitis    • Shortness of breath    • Sinus bradycardia    • Sinusitis    • Substance abuse    • Suspected sleep apnea    • Tobacco use disorder          Past Surgical History:   Procedure Laterality Date   • BRAIN TUMOR EXCISION Left 11/2015   • CHOLECYSTECTOMY     • CORONARY STENT PLACEMENT     • KNEE ARTHROSCOPY Left    • SHOULDER ROTATOR CUFF REPAIR Left    • TOTAL HIP ARTHROPLASTY Left 2009       Current Outpatient Prescriptions on File Prior to Visit    Medication Sig Dispense Refill   • albuterol (PROVENTIL HFA;VENTOLIN HFA) 108 (90 Base) MCG/ACT inhaler Inhale 2 puffs Every 4 (Four) Hours As Needed for Wheezing. 6.7 g 6   • cyanocobalamin ([employer] VITAMIN B-12) 1000 MCG tablet 1 tablet daily 30 tablet 5   • folic acid (FOLVITE) 1 MG tablet Take 1 tablet by mouth Daily. (Patient taking differently: Take 4 mg by mouth Every 7 (Seven) Days.) 30 tablet 5   • HYDROcodone-acetaminophen (NORCO) 7.5-325 MG per tablet Take 1 tablet by mouth Every 6 (Six) Hours As Needed for Severe Pain . 120 tablet 0   • irbesartan (AVAPRO) 300 MG tablet Take 1 tablet by mouth Daily. 90 tablet 1   • meclizine (ANTIVERT) 25 MG tablet Take 1 tablet by mouth Every 12 (Twelve) Hours As Needed for dizziness.  2   • nitroglycerin (NITROSTAT) 0.4 MG SL tablet Place 1 tablet under the tongue Every 5 (Five) Minutes As Needed for Chest Pain. Take no more than 3 doses in 15 minutes. 25 tablet 0   • PARoxetine (PAXIL) 20 MG tablet Take 1 tablet by mouth Daily. 90 tablet 1   • umeclidinium-vilanterol (ANORO ELLIPTA) 62.5-25 MCG/INH aerosol powder  inhaler Inhale 1 puff Daily. 1 each 6   • warfarin (COUMADIN) 2.5 MG tablet Take 1 tablet by mouth Daily. 30 tablet 2   • [DISCONTINUED] diltiaZEM (CARDIZEM) 60 MG tablet Take 1 tablet by mouth 2 (Two) Times a Day. 60 tablet 11   • [DISCONTINUED] spironolactone (ALDACTONE) 25 MG tablet Take 1 tablet by mouth Daily. 90 tablet 2     No current facility-administered medications on file prior to visit.        Social History     Social History   • Marital status:      Spouse name: N/A   • Number of children: N/A   • Years of education: N/A     Occupational History   • Not on file.     Social History Main Topics   • Smoking status: Current Every Day Smoker     Years: 0.00     Types: Cigars   • Smokeless tobacco: Never Used   • Alcohol use 8.4 oz/week     14 Shots of liquor per week      Comment: Caffeine: coffee- rare; Soda- Sprite 3-12 oz cans daily   • Drug  "use: No   • Sexual activity: Defer     Other Topics Concern   • Not on file     Social History Narrative   • No narrative on file           Review of Systems   Constitution: Negative.   HENT: Negative for congestion.    Eyes: Negative for vision loss in left eye and vision loss in right eye.   Respiratory: Negative.  Negative for cough, hemoptysis, shortness of breath, sleep disturbances due to breathing, snoring, sputum production and wheezing.    Endocrine: Negative.    Hematologic/Lymphatic: Negative.    Skin: Negative for poor wound healing and rash.   Musculoskeletal: Negative for falls, gout, muscle cramps and myalgias.   Gastrointestinal: Negative for abdominal pain, diarrhea, dysphagia, hematemesis, melena, nausea and vomiting.   Neurological: Negative for excessive daytime sleepiness, dizziness, headaches, light-headedness, loss of balance, seizures and vertigo.   Psychiatric/Behavioral: Negative for depression and substance abuse. The patient is not nervous/anxious.        Procedures    ECG 12 Lead  Date/Time: 6/28/2018 1:31 PM  Performed by: GUIDO MCCRARY  Authorized by: GUIDO MCCRARY   Comparison: compared with previous ECG   Similar to previous ECG  Rhythm: atrial fibrillation  Conduction: right bundle branch block  Clinical impression: abnormal ECG                Objective:      Vitals:    06/28/18 1315   BP: 130/80   BP Location: Right arm   Patient Position: Sitting   Pulse: (!) 134   Weight: 117 kg (259 lb)   Height: 172.7 cm (67.99\")     Body mass index is 39.39 kg/m².    Physical Exam   Constitutional: He is oriented to person, place, and time. He appears well-developed and well-nourished. No distress.   HENT:   Head: Normocephalic and atraumatic.   Eyes: Conjunctivae are normal. No scleral icterus.   Neck: Neck supple. No JVD present. Carotid bruit is not present. No thyromegaly present.   Cardiovascular: S1 normal, S2 normal, normal heart sounds and intact distal pulses.  An " irregularly irregular rhythm present.  No extrasystoles are present. Tachycardia present.  PMI is not displaced.  Exam reveals no gallop.    No murmur heard.  Pulses:       Carotid pulses are 2+ on the right side, and 2+ on the left side.       Radial pulses are 2+ on the right side, and 2+ on the left side.        Dorsalis pedis pulses are 2+ on the right side, and 2+ on the left side.        Posterior tibial pulses are 2+ on the right side, and 2+ on the left side.   Pulmonary/Chest: Effort normal. No respiratory distress. He has decreased breath sounds in the right middle field and the right lower field. He has no wheezes. He has no rhonchi. He has no rales. He exhibits no tenderness.   Abdominal: Soft. Bowel sounds are normal. He exhibits no distension, no abdominal bruit and no mass. There is no tenderness.   Musculoskeletal: He exhibits no edema or deformity.   Lymphadenopathy:     He has no cervical adenopathy.   Neurological: He is alert and oriented to person, place, and time. No cranial nerve deficit.   Skin: Skin is warm and dry. No rash noted. He is not diaphoretic. No cyanosis. No pallor. Nails show no clubbing.   Psychiatric: He has a normal mood and affect. Judgment normal.   Vitals reviewed.      Lab Review:       Assessment:      Diagnosis Plan   1. Atrial fibrillation with RVR     2. Essential hypertension     3. Left ventricular dysfunction     4. Shortness of breath       1. Atrial flutter on warfarin, heart rate is fast. On warfarin and diltiazem, metoprolol caused a lot of fatigue.   2. Hypertension  3. Cardiomyopathy. This was while in atrial flutter with RVR.  He had a normal PET stress with normal LVEF 6/6/18.   4. BUFFY  5. Obesity.   6. Gout  7. Bladder cancer with h/o hematuria, sees Dr. Lincoln.   8. Alcohol dependence, advised cessation.   9. Tobacco use.           Plan:       Start furosemide 20 mg daily, start potassium 10 mEq daily, start amiodarone 200 mg daily, increased diltiazem to  90 mg 3 times daily, stop spironolactone.

## 2018-07-12 ENCOUNTER — TELEPHONE (OUTPATIENT)
Dept: CARDIOLOGY | Facility: CLINIC | Age: 73
End: 2018-07-12

## 2018-07-12 NOTE — TELEPHONE ENCOUNTER
----- Message from ENDER Villafuerte sent at 6/28/2018  3:59 PM EDT -----    6/28: He saw Dr. Campos today.  He is scheduled to follow-up with me on 7/16.  We need to call him and have a lipid panel done the day he comes in for the visit with me.    ----- Message -----  From: ENDER Villafuerte  Sent: 6/26/2018   5:13 PM  To: ENDER Villafuerte      Follow-up on blood pressure/EKG  check and lipid panel/AST/ALT

## 2018-07-12 NOTE — TELEPHONE ENCOUNTER
Please call and remind him to have a fasting lipid panel and blood work completed at the main lab on the day of his appointment with me 7/16.  Thank you

## 2022-02-11 NOTE — PROGRESS NOTES
QUICK REFERENCE INFORMATION:  The ABCs of the Annual Wellness Visit    Subsequent Medicare Wellness Visit    HEALTH RISK ASSESSMENT    1945    Recent Hospitalizations:  No hospitalization(s) within the last year..        Current Medical Providers:  Patient Care Team:  Todd Craven MD as PCP - General (Internal Medicine)  ENDER Patel as PCP - Claims Attributed        Smoking Status:  History   Smoking Status   • Current Every Day Smoker   • Types: Cigars   Smokeless Tobacco   • Never Used     Comment: 3-4 daily       Alcohol Consumption:  History   Alcohol Use   • Yes     Comment: occasional       Depression Screen:   PHQ-9 Depression Screening 8/8/2017   Little interest or pleasure in doing things 0   Feeling down, depressed, or hopeless 0   Trouble falling or staying asleep, or sleeping too much -   Feeling tired or having little energy -   Poor appetite or overeating -   Feeling bad about yourself - or that you are a failure or have let yourself or your family down -   Trouble concentrating on things, such as reading the newspaper or watching television -   Moving or speaking so slowly that other people could have noticed. Or the opposite - being so fidgety or restless that you have been moving around a lot more than usual -   Thoughts that you would be better off dead, or of hurting yourself in some way -   PHQ-9 Total Score 0   If you checked off any problems, how difficult have these problems made it for you to do your work, take care of things at home, or get along with other people? -       Health Habits and Functional and Cognitive Screening:  No flowsheet data found.           Does the patient have evidence of cognitive impairment? No    Aspirin use counseling: Taking ASA appropriately as indicated      Recent Lab Results:  CMP:  Lab Results   Component Value Date     (H) 10/18/2016    BUN 11 04/20/2017    CREATININE 1.02 04/20/2017    EGFRIFNONA 72 04/20/2017    EGFRIFAFRI 92  10/18/2016    BCR 10.8 04/20/2017     04/20/2017    K 3.7 04/20/2017    CO2 31.0 04/20/2017    CALCIUM 8.8 04/20/2017    PROTENTOTREF 6.1 10/18/2016    ALBUMIN 3.64 04/20/2017    LABGLOBREF 2.0 10/18/2016    LABIL2 1.2 04/20/2017    BILITOT 0.6 04/20/2017    ALKPHOS 72 04/20/2017    AST 18 04/20/2017    ALT 22 04/20/2017     Lipid Panel:  Lab Results   Component Value Date    CHOL 240 (H) 01/18/2017    TRIG 248 (H) 01/18/2017    HDL 59 01/18/2017    VLDL 49.6 01/18/2017    LDLCALC 131 (H) 01/18/2017    LDLHDL 2.23 01/18/2017     HbA1c:  Lab Results   Component Value Date    HGBA1C 5.5 04/20/2017       Visual Acuity:  No exam data present    Age-appropriate Screening Schedule:  Refer to the list below for future screening recommendations based on patient's age, sex and/or medical conditions. Orders for these recommended tests are listed in the plan section. The patient has been provided with a written plan.    Health Maintenance   Topic Date Due   • TDAP/TD VACCINES (1 - Tdap) 07/16/1965   • ZOSTER VACCINE  04/01/2016   • INFLUENZA VACCINE  09/01/2017   • HEMOGLOBIN A1C  10/20/2017   • LIPID PANEL  01/18/2018   • DIABETIC EYE EXAM  04/13/2018   • DIABETIC FOOT EXAM  08/08/2018   • URINE MICROALBUMIN  08/08/2018   • COLONOSCOPY  03/23/2027   • PNEUMOCOCCAL VACCINES (65+ LOW/MEDIUM RISK)  Completed        Subjective   History of Present Illness    Steve Montana is a 72 y.o. male who presents for an Subsequent Wellness Visit.    The following portions of the patient's history were reviewed and updated as appropriate: allergies, current medications, past family history, past medical history, past social history, past surgical history and problem list.    Outpatient Medications Prior to Visit   Medication Sig Dispense Refill   • allopurinol (ZYLOPRIM) 300 MG tablet TAKE ONE TABLET BY MOUTH DAILY 90 tablet 2   • aspirin 81 MG tablet Take 81 mg by mouth.     • cimetidine (TAGAMET) 800 MG tablet TAKE ONE TABLET BY  "MOUTH DAILY 90 tablet 1   • cyanocobalamin ([employer] VITAMIN B-12) 1000 MCG tablet 1 tablet daily 30 tablet 5   • folic acid (FOLVITE) 1 MG tablet Take 1 tablet by mouth Daily. 30 tablet 5   • hydrochlorothiazide (HYDRODIURIL) 12.5 MG tablet TAKE ONE TABLET BY MOUTH EVERY MORNING 90 tablet 3   • HYDROcodone-acetaminophen (NORCO) 7.5-325 MG per tablet Take 1 tablet by mouth Every 6 (Six) Hours As Needed for Moderate Pain . 120 tablet 0   • irbesartan (AVAPRO) 300 MG tablet Take 1 tablet by mouth Daily. 90 tablet 3   • irbesartan (AVAPRO) 300 MG tablet TAKE ONE TABLET BY MOUTH ONCE DAILY 90 tablet 1   • naproxen (NAPROSYN) 500 MG tablet Take 500 mg by mouth 2 (two) times a day with meals.     • PARoxetine (PAXIL) 20 MG tablet TAKE ONE TABLET BY MOUTH DAILY 90 tablet 1     No facility-administered medications prior to visit.        Patient Active Problem List   Diagnosis   • Essential hypertension   • Alcohol dependence   • Medicare annual wellness visit, subsequent   • Depression   • Chronic gout of multiple sites   • Arthralgia of multiple joints   • Generalized osteoarthritis of multiple sites   • Controlled substance agreement signed       Advance Care Planning:  has NO advance directive - not interested in additional information    Identification of Risk Factors:  Risk factors include: weight .    Review of Systems    Compared to one year ago, the patient feels his physical health is better.  Compared to one year ago, the patient feels his mental health is the same.    Objective     Physical Exam    Vitals:    08/08/17 1128   BP: 161/74   BP Location: Left arm   Patient Position: Sitting   Cuff Size: Adult   Pulse: 80   Temp: 98.5 °F (36.9 °C)   TempSrc: Tympanic   SpO2: 92%   Weight: 240 lb (109 kg)   Height: 68\" (172.7 cm)       Body mass index is 36.49 kg/(m^2).  Discussed the patient's BMI with him. The BMI is above average; BMI management plan is completed.    Assessment/Plan   Patient Self-Management and " Personalized Health Advice  The patient has been provided with information about: diet, exercise and weight management and preventive services including:   · Exercise counseling provided, Nutrition counseling provided.    Visit Diagnoses:    ICD-10-CM ICD-9-CM   1. Medicare annual wellness visit, subsequent Z00.00 V70.0   2. Other depression F32.89    3. Alcohol dependence in remission F10.21 303.93   4. Essential hypertension I10 401.9   5. Arthralgia of multiple joints M25.50 719.49   6. Idiopathic chronic gout of multiple sites without tophus [id number] 274.02   7. Generalized osteoarthritis of multiple sites M15.9 715.09   8. Controlled substance agreement signed Z79.899 V58.69       No orders of the defined types were placed in this encounter.      Outpatient Encounter Prescriptions as of 8/8/2017   Medication Sig Dispense Refill   • allopurinol (ZYLOPRIM) 300 MG tablet TAKE ONE TABLET BY MOUTH DAILY 90 tablet 2   • aspirin 81 MG tablet Take 81 mg by mouth.     • cimetidine (TAGAMET) 800 MG tablet TAKE ONE TABLET BY MOUTH DAILY 90 tablet 1   • cyanocobalamin ([employer] VITAMIN B-12) 1000 MCG tablet 1 tablet daily 30 tablet 5   • folic acid (FOLVITE) 1 MG tablet Take 1 tablet by mouth Daily. 30 tablet 5   • hydrochlorothiazide (HYDRODIURIL) 12.5 MG tablet TAKE ONE TABLET BY MOUTH EVERY MORNING 90 tablet 3   • HYDROcodone-acetaminophen (NORCO) 7.5-325 MG per tablet Take 1 tablet by mouth Every 6 (Six) Hours As Needed for Moderate Pain . 120 tablet 0   • irbesartan (AVAPRO) 300 MG tablet Take 1 tablet by mouth Daily. 90 tablet 3   • irbesartan (AVAPRO) 300 MG tablet TAKE ONE TABLET BY MOUTH ONCE DAILY 90 tablet 1   • naproxen (NAPROSYN) 500 MG tablet Take 500 mg by mouth 2 (two) times a day with meals.     • PARoxetine (PAXIL) 20 MG tablet TAKE ONE TABLET BY MOUTH DAILY 90 tablet 1     No facility-administered encounter medications on file as of 8/8/2017.        Reviewed use of high risk medication in the elderly:  yes  Reviewed for potential of harmful drug interactions in the elderly: yes    Follow Up:  No Follow-up on file.     An After Visit Summary and PPPS with all of these plans were given to the patient.         ICU